# Patient Record
Sex: MALE | Race: WHITE | NOT HISPANIC OR LATINO | Employment: FULL TIME | URBAN - METROPOLITAN AREA
[De-identification: names, ages, dates, MRNs, and addresses within clinical notes are randomized per-mention and may not be internally consistent; named-entity substitution may affect disease eponyms.]

---

## 2017-02-21 ENCOUNTER — OFFICE VISIT (OUTPATIENT)
Dept: INTERNAL MEDICINE | Age: 57
End: 2017-02-21

## 2017-02-21 VITALS
TEMPERATURE: 97.6 F | HEART RATE: 74 BPM | WEIGHT: 227.4 LBS | BODY MASS INDEX: 36.55 KG/M2 | HEIGHT: 66 IN | OXYGEN SATURATION: 96 % | DIASTOLIC BLOOD PRESSURE: 82 MMHG | SYSTOLIC BLOOD PRESSURE: 124 MMHG

## 2017-02-21 DIAGNOSIS — R73.09 ELEVATED GLUCOSE: ICD-10-CM

## 2017-02-21 DIAGNOSIS — R15.2 RECTAL URGENCY: Primary | ICD-10-CM

## 2017-02-21 DIAGNOSIS — Z00.00 ROUTINE HEALTH MAINTENANCE: ICD-10-CM

## 2017-02-21 PROBLEM — E55.9 AVITAMINOSIS D: Status: ACTIVE | Noted: 2017-02-21

## 2017-02-21 LAB
DEVELOPER EXPIRATION DATE: NORMAL
DEVELOPER LOT NUMBER: NORMAL
EXPIRATION DATE: NORMAL
FECAL OCCULT BLOOD SCREEN, POC: NEGATIVE
Lab: NORMAL
NEGATIVE CONTROL: NEGATIVE
POSITIVE CONTROL: POSITIVE

## 2017-02-21 PROCEDURE — 82274 ASSAY TEST FOR BLOOD FECAL: CPT | Performed by: INTERNAL MEDICINE

## 2017-02-21 PROCEDURE — 99204 OFFICE O/P NEW MOD 45 MIN: CPT | Performed by: INTERNAL MEDICINE

## 2017-02-21 PROCEDURE — 90471 IMMUNIZATION ADMIN: CPT | Performed by: INTERNAL MEDICINE

## 2017-02-21 PROCEDURE — 90715 TDAP VACCINE 7 YRS/> IM: CPT | Performed by: INTERNAL MEDICINE

## 2017-02-21 RX ORDER — LORATADINE 10 MG/1
1 TABLET ORAL DAILY
COMMUNITY
Start: 2014-08-11 | End: 2017-04-06 | Stop reason: ALTCHOICE

## 2017-02-21 NOTE — PROGRESS NOTES
Subjective   Junior Estrada Jr. is a 56 y.o. male.     History of Present Illness patient notes recent onset of rectal urgency with significant bowel movement.  Stools have been formed, no diarrhea noted, and no blood in the stool.  Last colonoscopy was done at least 10 if not more years ago.  His been no associated abdominal pain.  These 2 episodes occurred following a meal.  The nails were fairly bland.. He is concerned that this may be a recurrent pattern and occur when he has no proximity to a restroom.  The last colonoscopy was done because of concern about possible irritable bowel syndrome.  Findings at the procedure include diverticuli only.. He notes no specific foods which seem to cause problems for him in the past he has had some issues with decreased frequency of stooling but would not describe this as constipation.    History of elevated glucose, patient is not had an A1c in quite a long time.  His control this well with weight loss and exercise.    Health maintenance: He is overdue for tetanus immunization today.    Ophthalmology, possibly 10 months ago, dentist in the past month.  Exercise walking 10,000 steps daily.    The following portions of the patient's history were reviewed and updated as appropriate: allergies, current medications, past family history, past medical history, past social history, past surgical history and problem list.    Review of Systems   Constitutional: Negative.    HENT: Negative.    Eyes: Negative.    Respiratory: Positive for cough.         Intermittent over the past several months, controlled with as needed use of Mucinex.  Last chest x-ray unknown.    Cardiovascular: Negative.    Gastrointestinal:        See history of present illness   Endocrine: Negative.    Genitourinary: Negative.    Musculoskeletal: Negative.    Skin: Negative.    Allergic/Immunologic: Negative for immunocompromised state.   Neurological: Negative.    Hematological: Negative.     Psychiatric/Behavioral: Negative.        Objective   Physical Exam   Constitutional: He is oriented to person, place, and time. He appears well-developed and well-nourished. No distress.   HENT:   Head: Normocephalic and atraumatic.   Right Ear: Tympanic membrane, external ear and ear canal normal.   Left Ear: Tympanic membrane, external ear and ear canal normal.   Mouth/Throat: Uvula is midline, oropharynx is clear and moist and mucous membranes are normal.   Eyes: Conjunctivae and EOM are normal. Pupils are equal, round, and reactive to light.   Neck: Normal range of motion. Neck supple. No JVD present. Carotid bruit is not present. No thyromegaly present.   Cardiovascular: Normal rate, regular rhythm, normal heart sounds and intact distal pulses.  Exam reveals no gallop and no friction rub.    No murmur heard.  Pulmonary/Chest: Effort normal and breath sounds normal. He has no wheezes. He has no rales.   Abdominal: Soft. Bowel sounds are normal. There is no hepatosplenomegaly. There is no tenderness.   Genitourinary: Prostate normal. Prostate is not enlarged and not tender.   Musculoskeletal: Normal range of motion. He exhibits no edema or deformity.   Lymphadenopathy:     He has no cervical adenopathy.   Neurological: He is alert and oriented to person, place, and time. He has normal strength. No sensory deficit.   Skin: Skin is warm and dry. No rash noted.   Psychiatric: He has a normal mood and affect. His speech is normal and behavior is normal. Judgment and thought content normal. Cognition and memory are normal.   Nursing note and vitals reviewed.      Assessment/Plan   Junior was seen today for rectal urgency.    Diagnoses and all orders for this visit:    Rectal urgency  -     CBC & Differential  -     Comprehensive Metabolic Panel  -     POC Occult Blood Stool  -     Ambulatory Referral For Screening Colonoscopy  -     hyoscyamine (LEVSIN/SL) 0.125 MG SL tablet; Take 1 tablet by mouth 3 (Three) Times  a Day Before Meals.    Elevated glucose  -     Hemoglobin A1c  -     Microalbumin / Creatinine Urine Ratio    Routine health maintenance  -     Tdap Vaccine Greater Than or Equal To 6yo IM  -     Lipid Panel  -     PSA        Number-one  Set of rectal urgency, differential diagnosis includes irritable bowel, celiac disease, food related issues, or structural abnormalities in the colon.  We'll check lab as above, along with a consult for colonoscopy.  Would treat empirically with Levsin for 10 days to see if this provides any degree of relief from symptoms..  Patient to contact me by phone in 10-14 days with an update on his symptoms.    #2 history of elevated glucose, controlled to be determined.  Lab as above, follow-up results by mail or online as appropriate..    #3 routine health maintenance, Adacel today, we'll also check PSA.

## 2017-02-25 DIAGNOSIS — R19.4 ALTERED BOWEL HABITS: Primary | ICD-10-CM

## 2017-02-25 LAB
ALBUMIN SERPL-MCNC: 4.3 G/DL (ref 3.5–5.2)
ALBUMIN/CREAT UR: <2.5 MG/G CREAT (ref 0–30)
ALBUMIN/GLOB SERPL: 1.8 G/DL
ALP SERPL-CCNC: 64 U/L (ref 39–117)
ALT SERPL-CCNC: 16 U/L (ref 1–41)
AST SERPL-CCNC: 14 U/L (ref 1–40)
BASOPHILS # BLD AUTO: 0.02 10*3/MM3 (ref 0–0.2)
BASOPHILS NFR BLD AUTO: 0.4 % (ref 0–1.5)
BILIRUB SERPL-MCNC: 0.6 MG/DL (ref 0.1–1.2)
BUN SERPL-MCNC: 16 MG/DL (ref 6–20)
BUN/CREAT SERPL: 13.7 (ref 7–25)
CALCIUM SERPL-MCNC: 9.4 MG/DL (ref 8.6–10.5)
CHLORIDE SERPL-SCNC: 103 MMOL/L (ref 98–107)
CHOLEST SERPL-MCNC: 207 MG/DL (ref 0–200)
CO2 SERPL-SCNC: 24.7 MMOL/L (ref 22–29)
CREAT SERPL-MCNC: 1.17 MG/DL (ref 0.76–1.27)
CREAT UR-MCNC: 119.5 MG/DL
EOSINOPHIL # BLD AUTO: 0.17 10*3/MM3 (ref 0–0.7)
EOSINOPHIL NFR BLD AUTO: 3.4 % (ref 0.3–6.2)
ERYTHROCYTE [DISTWIDTH] IN BLOOD BY AUTOMATED COUNT: 12.6 % (ref 11.5–14.5)
GLOBULIN SER CALC-MCNC: 2.4 GM/DL
GLUCOSE SERPL-MCNC: 98 MG/DL (ref 65–99)
HBA1C MFR BLD: 5.3 % (ref 4.8–5.6)
HCT VFR BLD AUTO: 43.1 % (ref 40.4–52.2)
HDLC SERPL-MCNC: 69 MG/DL (ref 40–60)
HGB BLD-MCNC: 14.5 G/DL (ref 13.7–17.6)
IMM GRANULOCYTES # BLD: 0 10*3/MM3 (ref 0–0.03)
IMM GRANULOCYTES NFR BLD: 0 % (ref 0–0.5)
LDLC SERPL CALC-MCNC: 105 MG/DL (ref 0–100)
LYMPHOCYTES # BLD AUTO: 1.92 10*3/MM3 (ref 0.9–4.8)
LYMPHOCYTES NFR BLD AUTO: 38.7 % (ref 19.6–45.3)
MCH RBC QN AUTO: 30.5 PG (ref 27–32.7)
MCHC RBC AUTO-ENTMCNC: 33.6 G/DL (ref 32.6–36.4)
MCV RBC AUTO: 90.5 FL (ref 79.8–96.2)
MICROALBUMIN UR-MCNC: <3 UG/ML
MONOCYTES # BLD AUTO: 0.69 10*3/MM3 (ref 0.2–1.2)
MONOCYTES NFR BLD AUTO: 13.9 % (ref 5–12)
NEUTROPHILS # BLD AUTO: 2.16 10*3/MM3 (ref 1.9–8.1)
NEUTROPHILS NFR BLD AUTO: 43.6 % (ref 42.7–76)
PLATELET # BLD AUTO: 201 10*3/MM3 (ref 140–500)
POTASSIUM SERPL-SCNC: 4.6 MMOL/L (ref 3.5–5.2)
PROT SERPL-MCNC: 6.7 G/DL (ref 6–8.5)
PSA SERPL-MCNC: 0.38 NG/ML (ref 0–4)
RBC # BLD AUTO: 4.76 10*6/MM3 (ref 4.6–6)
SODIUM SERPL-SCNC: 141 MMOL/L (ref 136–145)
TRIGL SERPL-MCNC: 166 MG/DL (ref 0–150)
VLDLC SERPL CALC-MCNC: 33.2 MG/DL (ref 5–40)
WBC # BLD AUTO: 4.96 10*3/MM3 (ref 4.5–10.7)

## 2017-02-28 LAB
ENDOMYSIUM IGA SER QL: NEGATIVE
GLIADIN PEPTIDE IGA SER-ACNC: 4 UNITS (ref 0–19)
GLIADIN PEPTIDE IGG SER-ACNC: 2 UNITS (ref 0–19)
IGA SERPL-MCNC: 207 MG/DL (ref 90–386)
Lab: NORMAL
TTG IGA SER-ACNC: <2 U/ML (ref 0–3)
TTG IGG SER-ACNC: 2 U/ML (ref 0–5)
WRITTEN AUTHORIZATION: NORMAL

## 2017-03-30 ENCOUNTER — DOCUMENTATION (OUTPATIENT)
Dept: INTERNAL MEDICINE | Age: 57
End: 2017-03-30

## 2017-04-06 ENCOUNTER — OFFICE VISIT (OUTPATIENT)
Dept: GASTROENTEROLOGY | Facility: CLINIC | Age: 57
End: 2017-04-06

## 2017-04-06 VITALS
WEIGHT: 221 LBS | HEIGHT: 66 IN | SYSTOLIC BLOOD PRESSURE: 126 MMHG | BODY MASS INDEX: 35.52 KG/M2 | DIASTOLIC BLOOD PRESSURE: 88 MMHG

## 2017-04-06 DIAGNOSIS — R15.2 FECAL URGENCY: ICD-10-CM

## 2017-04-06 DIAGNOSIS — Z12.11 ENCOUNTER FOR SCREENING FOR MALIGNANT NEOPLASM OF COLON: Primary | ICD-10-CM

## 2017-04-06 PROCEDURE — 99204 OFFICE O/P NEW MOD 45 MIN: CPT | Performed by: INTERNAL MEDICINE

## 2017-04-06 RX ORDER — SODIUM CHLORIDE, SODIUM LACTATE, POTASSIUM CHLORIDE, CALCIUM CHLORIDE 600; 310; 30; 20 MG/100ML; MG/100ML; MG/100ML; MG/100ML
30 INJECTION, SOLUTION INTRAVENOUS CONTINUOUS
Status: CANCELLED | OUTPATIENT
Start: 2017-04-06

## 2017-04-06 RX ORDER — SODIUM CHLORIDE 0.9 % (FLUSH) 0.9 %
1-10 SYRINGE (ML) INJECTION AS NEEDED
Status: CANCELLED | OUTPATIENT
Start: 2017-04-06

## 2017-04-06 NOTE — PROGRESS NOTES
Subjective   Junior Estrada Jr. is a 56 y.o. male is being seen for consultation today at the request of No ref. provider found  Chief Complaint   Patient presents with   • GI Problem     rectal urgency     History of Present Illness  Patient is here primarily to schedule colonoscopy for routine screening.  His last examination was about 10 years ago.  What prompted him at this time is about a few weeks ago he had 1 or 2 episodes of fecal urgency and feels he was rojelio that he didn't soil himself.  The symptoms have resolved since then.  The following portions of the patient's history were reviewed and updated as appropriate: allergies, current medications, past family history, past medical history, past social history, past surgical history and problem list.    Review of Systems   Constitutional: Negative for appetite change, diaphoresis, fatigue, fever and unexpected weight change.   HENT: Negative for hearing loss, mouth sores, sore throat and trouble swallowing.    Eyes: Negative for pain and redness.   Respiratory: Negative for choking and shortness of breath.    Cardiovascular: Negative for chest pain and leg swelling.   Gastrointestinal: Negative for abdominal distention, abdominal pain, anal bleeding, blood in stool, constipation, diarrhea, nausea, rectal pain and vomiting.   Genitourinary: Negative for flank pain and hematuria.   Musculoskeletal: Negative for arthralgias and joint swelling.   Skin: Negative for color change and rash.   Allergic/Immunologic: Negative for food allergies and immunocompromised state.   Neurological: Negative for dizziness, seizures and headaches.   Hematological: Does not bruise/bleed easily.   Psychiatric/Behavioral: Negative for confusion, sleep disturbance and suicidal ideas. The patient is not nervous/anxious.        Objective   Physical Exam   Constitutional: He is oriented to person, place, and time. He appears well-developed and well-nourished.   HENT:   Head:  Normocephalic and atraumatic.   Nose: Nose normal.   Eyes: Conjunctivae are normal. Pupils are equal, round, and reactive to light.   Neck: Normal range of motion. Neck supple. No thyromegaly present.   Cardiovascular: Normal heart sounds.  Exam reveals no gallop and no friction rub.    No murmur heard.  Pulmonary/Chest: Effort normal and breath sounds normal.   Abdominal: Soft. Bowel sounds are normal. He exhibits no distension and no mass. There is no tenderness.   Musculoskeletal: He exhibits no edema.   Lymphadenopathy:     He has no cervical adenopathy.   Neurological: He is alert and oriented to person, place, and time.   Skin: No rash noted. No erythema.   Psychiatric: He has a normal mood and affect. His behavior is normal.   Nursing note and vitals reviewed.        Assessment/Plan   Problems Addressed this Visit        Digestive    Fecal urgency       Other    Encounter for screening for malignant neoplasm of colon - Primary    Relevant Orders    Case Request (Completed)        Colonoscopy.

## 2017-05-08 ENCOUNTER — ANESTHESIA EVENT (OUTPATIENT)
Dept: GASTROENTEROLOGY | Facility: HOSPITAL | Age: 57
End: 2017-05-08

## 2017-05-08 ENCOUNTER — HOSPITAL ENCOUNTER (OUTPATIENT)
Facility: HOSPITAL | Age: 57
Setting detail: HOSPITAL OUTPATIENT SURGERY
Discharge: HOME | End: 2017-05-08
Attending: INTERNAL MEDICINE | Admitting: INTERNAL MEDICINE

## 2017-05-08 ENCOUNTER — ANESTHESIA (OUTPATIENT)
Dept: GASTROENTEROLOGY | Facility: HOSPITAL | Age: 57
End: 2017-05-08

## 2017-05-08 VITALS
HEIGHT: 66 IN | DIASTOLIC BLOOD PRESSURE: 90 MMHG | OXYGEN SATURATION: 99 % | HEART RATE: 60 BPM | WEIGHT: 225.4 LBS | TEMPERATURE: 98 F | BODY MASS INDEX: 36.22 KG/M2 | SYSTOLIC BLOOD PRESSURE: 118 MMHG | RESPIRATION RATE: 16 BRPM

## 2017-05-08 DIAGNOSIS — Z12.11 ENCOUNTER FOR SCREENING FOR MALIGNANT NEOPLASM OF COLON: ICD-10-CM

## 2017-05-08 PROCEDURE — 45385 COLONOSCOPY W/LESION REMOVAL: CPT | Performed by: INTERNAL MEDICINE

## 2017-05-08 PROCEDURE — 25010000002 PROPOFOL 10 MG/ML EMULSION: Performed by: ANESTHESIOLOGY

## 2017-05-08 PROCEDURE — 88305 TISSUE EXAM BY PATHOLOGIST: CPT | Performed by: INTERNAL MEDICINE

## 2017-05-08 RX ORDER — PROPOFOL 10 MG/ML
VIAL (ML) INTRAVENOUS CONTINUOUS PRN
Status: DISCONTINUED | OUTPATIENT
Start: 2017-05-08 | End: 2017-05-08 | Stop reason: SURG

## 2017-05-08 RX ORDER — SODIUM CHLORIDE 0.9 % (FLUSH) 0.9 %
1-10 SYRINGE (ML) INJECTION AS NEEDED
Status: DISCONTINUED | OUTPATIENT
Start: 2017-05-08 | End: 2017-05-08 | Stop reason: HOSPADM

## 2017-05-08 RX ORDER — PROPOFOL 10 MG/ML
VIAL (ML) INTRAVENOUS AS NEEDED
Status: DISCONTINUED | OUTPATIENT
Start: 2017-05-08 | End: 2017-05-08 | Stop reason: SURG

## 2017-05-08 RX ORDER — LORATADINE 10 MG/1
10 TABLET ORAL DAILY
COMMUNITY

## 2017-05-08 RX ORDER — LIDOCAINE HYDROCHLORIDE 20 MG/ML
INJECTION, SOLUTION INFILTRATION; PERINEURAL AS NEEDED
Status: DISCONTINUED | OUTPATIENT
Start: 2017-05-08 | End: 2017-05-08 | Stop reason: SURG

## 2017-05-08 RX ORDER — SODIUM CHLORIDE, SODIUM LACTATE, POTASSIUM CHLORIDE, CALCIUM CHLORIDE 600; 310; 30; 20 MG/100ML; MG/100ML; MG/100ML; MG/100ML
30 INJECTION, SOLUTION INTRAVENOUS CONTINUOUS
Status: DISCONTINUED | OUTPATIENT
Start: 2017-05-08 | End: 2017-05-08 | Stop reason: HOSPADM

## 2017-05-08 RX ADMIN — LIDOCAINE HYDROCHLORIDE 50 MG: 20 INJECTION, SOLUTION INFILTRATION; PERINEURAL at 09:19

## 2017-05-08 RX ADMIN — PROPOFOL 140 MCG/KG/MIN: 10 INJECTION, EMULSION INTRAVENOUS at 09:22

## 2017-05-08 RX ADMIN — PROPOFOL 200 MG: 10 INJECTION, EMULSION INTRAVENOUS at 09:20

## 2017-05-08 RX ADMIN — SODIUM CHLORIDE, POTASSIUM CHLORIDE, SODIUM LACTATE AND CALCIUM CHLORIDE 30 ML/HR: 600; 310; 30; 20 INJECTION, SOLUTION INTRAVENOUS at 09:01

## 2017-05-09 PROBLEM — D12.6 TUBULAR ADENOMA OF COLON: Status: ACTIVE | Noted: 2017-05-09

## 2017-05-09 LAB
CYTO UR: NORMAL
LAB AP CASE REPORT: NORMAL
Lab: NORMAL
PATH REPORT.FINAL DX SPEC: NORMAL
PATH REPORT.GROSS SPEC: NORMAL

## 2018-05-03 ENCOUNTER — DOCUMENTATION (OUTPATIENT)
Dept: INTERNAL MEDICINE | Age: 58
End: 2018-05-03

## 2018-05-03 PROBLEM — K21.9 GASTROESOPHAGEAL REFLUX DISEASE WITHOUT ESOPHAGITIS: Status: ACTIVE | Noted: 2018-05-03

## 2018-05-03 RX ORDER — OMEPRAZOLE 20 MG/1
20 CAPSULE, DELAYED RELEASE ORAL DAILY
Qty: 30 CAPSULE | Refills: 3 | Status: SHIPPED | OUTPATIENT
Start: 2018-05-03 | End: 2018-07-13 | Stop reason: SDUPTHER

## 2018-05-03 NOTE — PROGRESS NOTES
I was contacted by the patient yesterday regarding heartburn issues.  He treated this with a two-week course of omeprazole in December and that helped with symptoms significantly.  Within one day all symptoms completely resolved.  Symptoms recurred after discontinuing the medication.  In April he took another 2 week course and had the same result.  At this point he is experiencing symptoms more frequently.  He denied exertional chest pain.  This point I suggest omeprazole 20 mg.  Patient will also be sent a handout on reflux.  He does not respond as expected, may consider upper endoscopy.  Patient is advised to lose weight, avoid eating late at night, carbonated beverages, chocolate and peppermint.  I asked him to contact me in 30 days after using medication or sooner if the symptoms worsen.  Dr. Gupta

## 2018-05-08 ENCOUNTER — TELEPHONE (OUTPATIENT)
Dept: INTERNAL MEDICINE | Age: 58
End: 2018-05-08

## 2018-05-08 NOTE — TELEPHONE ENCOUNTER
Documentation     5/3/2018  [facility]   Gopal Gupta MD   Internal Medicine    Progress Notes        I was contacted by the patient yesterday regarding heartburn issues.  He treated this with a two-week course of omeprazole in December and that helped with symptoms significantly.  Within one day all symptoms completely resolved.  Symptoms recurred after discontinuing the medication.  In April he took another 2 week course and had the same result.  At this point he is experiencing symptoms more frequently.  He denied exertional chest pain.  This point I suggest omeprazole 20 mg.  Patient will also be sent a handout on reflux.  He does not respond as expected, may consider upper endoscopy.  Patient is advised to lose weight, avoid eating late at night, carbonated beverages, chocolate and peppermint.  I asked him to contact me in 30 days after using medication or sooner if the symptoms worsen.  Dr. Gupta

## 2018-05-08 NOTE — TELEPHONE ENCOUNTER
Called patient and he is aware he should try the medication for 30 days then call with an update.

## 2018-05-08 NOTE — TELEPHONE ENCOUNTER
Patient spoke to Dr. Gupta over the weekend but was told by him to call you to get an appointment with him. Please call the patient back at [phone].

## 2018-06-11 RX ORDER — HYOSCYAMINE SULFATE 0.12 MG/1
1 TABLET SUBLINGUAL
Qty: 120 EACH | Refills: 3 | Status: SHIPPED | OUTPATIENT
Start: 2018-06-11 | End: 2019-01-04

## 2018-07-13 RX ORDER — OMEPRAZOLE 20 MG/1
20 CAPSULE, DELAYED RELEASE ORAL DAILY
Qty: 90 CAPSULE | Refills: 1 | Status: SHIPPED | OUTPATIENT
Start: 2018-07-13 | End: 2019-01-04 | Stop reason: SDUPTHER

## 2019-01-04 ENCOUNTER — OFFICE VISIT (OUTPATIENT)
Dept: INTERNAL MEDICINE | Age: 59
End: 2019-01-04

## 2019-01-04 VITALS
HEIGHT: 66 IN | HEART RATE: 79 BPM | OXYGEN SATURATION: 95 % | WEIGHT: 249.2 LBS | BODY MASS INDEX: 40.05 KG/M2 | SYSTOLIC BLOOD PRESSURE: 140 MMHG | TEMPERATURE: 96.1 F | DIASTOLIC BLOOD PRESSURE: 90 MMHG

## 2019-01-04 DIAGNOSIS — Z00.00 ROUTINE HEALTH MAINTENANCE: Primary | ICD-10-CM

## 2019-01-04 DIAGNOSIS — K21.9 GASTROESOPHAGEAL REFLUX DISEASE WITHOUT ESOPHAGITIS: ICD-10-CM

## 2019-01-04 DIAGNOSIS — N39.43 BENIGN PROSTATIC HYPERPLASIA WITH POST-VOID DRIBBLING: ICD-10-CM

## 2019-01-04 DIAGNOSIS — R03.0 ELEVATED BLOOD PRESSURE READING WITHOUT DIAGNOSIS OF HYPERTENSION: ICD-10-CM

## 2019-01-04 DIAGNOSIS — N40.1 BENIGN PROSTATIC HYPERPLASIA WITH POST-VOID DRIBBLING: ICD-10-CM

## 2019-01-04 DIAGNOSIS — Z86.39 HISTORY OF ELEVATED GLUCOSE: ICD-10-CM

## 2019-01-04 PROCEDURE — 99396 PREV VISIT EST AGE 40-64: CPT | Performed by: INTERNAL MEDICINE

## 2019-01-04 PROCEDURE — 82270 OCCULT BLOOD FECES: CPT | Performed by: INTERNAL MEDICINE

## 2019-01-04 PROCEDURE — 90471 IMMUNIZATION ADMIN: CPT | Performed by: INTERNAL MEDICINE

## 2019-01-04 PROCEDURE — 90674 CCIIV4 VAC NO PRSV 0.5 ML IM: CPT | Performed by: INTERNAL MEDICINE

## 2019-01-04 RX ORDER — TAMSULOSIN HYDROCHLORIDE 0.4 MG/1
1 CAPSULE ORAL NIGHTLY
COMMUNITY
End: 2019-01-04 | Stop reason: SDUPTHER

## 2019-01-04 RX ORDER — TAMSULOSIN HYDROCHLORIDE 0.4 MG/1
1 CAPSULE ORAL NIGHTLY
Qty: 30 CAPSULE | Refills: 0 | Status: SHIPPED | OUTPATIENT
Start: 2019-01-04 | End: 2019-01-27 | Stop reason: SDUPTHER

## 2019-01-04 RX ORDER — HYOSCYAMINE SULFATE 0.12 MG/1
1 TABLET SUBLINGUAL 3 TIMES DAILY PRN
Qty: 120 EACH | Refills: 3 | COMMUNITY
Start: 2019-01-04 | End: 2019-05-23

## 2019-01-04 RX ORDER — OMEPRAZOLE 20 MG/1
CAPSULE, DELAYED RELEASE ORAL
Qty: 90 CAPSULE | Refills: 1 | Status: SHIPPED | OUTPATIENT
Start: 2019-01-04 | End: 2019-06-18 | Stop reason: SDUPTHER

## 2019-01-04 NOTE — PROGRESS NOTES
[facility] [place] [facility]  [address] MD      Junior Estrada . / 58 y.o. / male  01/04/2019      ASSESSMENT & PLAN:    Problem List Items Addressed This Visit        Unprioritized    Routine health maintenance - Primary    Relevant Medications    Hyoscyamine Sulfate SL (LEVSIN/SL) 0.125 MG sublingual tablet    Other Relevant Orders    Flucelvax Quad=>4Years (PFS)    CBC & Differential    Comprehensive Metabolic Panel    Hepatitis C Antibody    Lipid Panel    POC Occult Blood Stool    PSA DIAGNOSTIC    Urinalysis With Microscopic If Indicated (No Culture) - Urine, Clean Catch    Gastroesophageal reflux disease without esophagitis    Overview     May 2, 2018         Relevant Medications    omeprazole (PRILOSEC) 20 MG capsule    Hyoscyamine Sulfate SL (LEVSIN/SL) 0.125 MG sublingual tablet      Other Visit Diagnoses     History of elevated glucose        Relevant Orders    Hemoglobin A1c    Benign prostatic hyperplasia with post-void dribbling            Orders Placed This Encounter   Procedures   • Flucelvax Quad=>4Years (PFS)   • Comprehensive Metabolic Panel   • Hepatitis C Antibody   • Lipid Panel   • PSA DIAGNOSTIC   • Urinalysis With Microscopic If Indicated (No Culture) - Urine, Clean Catch   • Hemoglobin A1c   • POC Occult Blood Stool   • CBC & Differential       Summary/Discussion:    Number-one routine health maintenance, clinically stable, my main concern today is patient's weight.  We did discuss several options for exercise to avoid his orthopedic issues and also suggested Weight Watchers.  Weight loss will help with both reflux symptoms and overall cardiovascular fitness.  Recommend repeat exam in one year.  Laboratory to include UA, CBC, CMP, A1c, hepatitis C antibody, lipid PSA.  Recommend hepatitis A series as well as shingles when available sometime during the next 12 months.    #2 history of elevated glucose, will check FBS as above and A1c.    #3 reflux, continue meds, weight loss would be  "helpful.    #4  BPH with symptoms of post void dribbling.  We'll try empiric therapy with Flomax 0.4 mg 1 at bedtime, patient may increase to 2 tablets as needed.  If this is not helpful we'll discontinue the medication.    #5 elevated blood pressure without specific diagnosis of hypertension, patient will monitor blood pressure at work and contact me in about a week with an updated readings.      Return in about 1 year (around 1/4/2020) for Annual physical.    ____________________________________________________________________    VITALS    Visit Vitals  /90   Pulse 79   Temp 96.1 °F (35.6 °C) (Temporal)   Ht 167.6 cm (65.98\")   Wt 113 kg (249 lb 3.2 oz)   SpO2 95%   BMI 40.24 kg/m²       BP Readings from Last 3 Encounters:   01/04/19 140/90   05/08/17 118/90   04/06/17 126/88     Wt Readings from Last 3 Encounters:   01/04/19 113 kg (249 lb 3.2 oz)   05/08/17 102 kg (225 lb 6.4 oz)   04/06/17 100 kg (221 lb)      Body mass index is 40.24 kg/m².    CC:  Main reason(s) for today's visit: Annual Exam (CPE)      HPI: Patient presents this morning for annual physical exam.    Health maintenance: Last ophthalmology visit yearly in April, dentist every 6 months, last visit October 2018.  Exercise walking.    Patient has history of elevated glucose this was controlled the past with weight reduction.  We'll check A1c today.    Weight control: Exercise is limited because of ongoing issues with heel spurs.  This is been injected in the past, is also wearing orthotics but the issue still restrict his ability to walk.  We did discuss alternative forms of exercise to include stairstepper or a bike.  He does have access to multiple forms of aerobic activity at [employer].  I will also suggested consideration of weight watchers..       patient has history of reflux and this is been controlled with use of omeprazole.  He is aware that weight loss will help control symptoms as well.    History of rectal urgency, this is well " controlled with use of Levsin on an as-needed basis.        Patient Care Team:  Gopal Gupta MD as PCP - General (Internal Medicine)  ____________________________________________________________________    REVIEW OF SYSTEMS    Review of Systems   Constitutional: Negative.    HENT: Negative.    Eyes: Negative.    Respiratory: Negative.    Cardiovascular: Negative.    Gastrointestinal: Negative.    Endocrine: Negative.    Genitourinary:        Patient does have increased frequency of urine, there is some sensation of incomplete emptying and some terminal dribbling.   Musculoskeletal: Negative.    Skin: Negative.    Allergic/Immunologic: Negative for immunocompromised state.   Neurological: Negative.    Hematological: Negative.    Psychiatric/Behavioral: Negative.          PHYSICAL EXAMINATION    Physical Exam   Constitutional: He is oriented to person, place, and time. He appears well-developed. No distress.   OBESE   HENT:   Head: Normocephalic and atraumatic.   Right Ear: Tympanic membrane, external ear and ear canal normal.   Left Ear: Tympanic membrane, external ear and ear canal normal.   Mouth/Throat: Uvula is midline, oropharynx is clear and moist and mucous membranes are normal.   Eyes: Conjunctivae and EOM are normal. Pupils are equal, round, and reactive to light.   Neck: Normal range of motion. Neck supple. No JVD present. Carotid bruit is not present. No thyromegaly present.   Cardiovascular: Normal rate, regular rhythm, normal heart sounds and intact distal pulses. Exam reveals no gallop and no friction rub.   No murmur heard.  Pulmonary/Chest: Effort normal and breath sounds normal. He has no wheezes. He has no rales.   Abdominal: Soft. Bowel sounds are normal. There is no hepatosplenomegaly. There is no tenderness.   Genitourinary: Rectum normal and prostate normal.   Genitourinary Comments: Digital rectal exam shows no nodules.   Musculoskeletal: Normal range of motion. He exhibits no edema or  deformity.   Lymphadenopathy:     He has no cervical adenopathy.   Neurological: He is alert and oriented to person, place, and time. He has normal strength and normal reflexes. No cranial nerve deficit or sensory deficit. Coordination normal.   Skin: Skin is warm and dry. No rash noted.   Psychiatric: He has a normal mood and affect. His speech is normal and behavior is normal. Judgment and thought content normal. Cognition and memory are normal.   Nursing note and vitals reviewed.        REVIEWED DATA:    Labs:     Lab Results   Component Value Date     02/24/2017    K 4.6 02/24/2017    AST 14 02/24/2017    ALT 16 02/24/2017    BUN 16 02/24/2017    CREATININE 1.17 02/24/2017    EGFRIFNONA 64 02/24/2017    EGFRIFAFRI 78 02/24/2017       Lab Results   Component Value Date    HGBA1C 5.30 02/24/2017    MICROALBUR <3.0 02/24/2017       Lab Results   Component Value Date     (H) 02/24/2017    HDL 69 (H) 02/24/2017    TRIG 166 (H) 02/24/2017       No results found for: TSH, FREET4    Lab Results   Component Value Date    WBC 4.96 02/24/2017    HGB 14.5 02/24/2017     02/24/2017       Lab Results   Component Value Date    PSA 0.377 02/24/2017         Imaging:         Medical Tests:         Summary of old records / correspondence / consultant report:         Request outside records:         ALLERGIES  Allergies   Allergen Reactions   • No Known Drug Allergy         MEDICATIONS  Current Outpatient Medications   Medication Sig Dispense Refill   • tamsulosin (FLOMAX) 0.4 MG capsule 24 hr capsule Take 1 capsule by mouth Every Night. 30 capsule 0   • Hyoscyamine Sulfate SL (LEVSIN/SL) 0.125 MG sublingual tablet Place 1 tablet under the tongue 3 (Three) Times a Day As Needed (Dyspepsia, rectal urgency). 120 each 3   • loratadine (CLARITIN) 10 MG tablet Take 10 mg by mouth Daily.     • omeprazole (PRILOSEC) 20 MG capsule Take 1 capsule by mouth Daily. 90 capsule 01     No current facility-administered  medications for this visit.        PFSH:     The following portions of the patient's history were reviewed and updated as appropriate: Allergies / Current Medications / Past Medical History / Surgical History / Social History / Family History    PROBLEM LIST   Patient Active Problem List   Diagnosis   • Avitaminosis D   • Routine health maintenance   • Rectal urgency   • Elevated glucose   • Altered bowel habits   • Encounter for screening for malignant neoplasm of colon   • Fecal urgency   • Tubular adenoma of colon   • Gastroesophageal reflux disease without esophagitis       PAST MEDICAL HISTORY  Past Medical History:   Diagnosis Date   • Cancer (CMS/HCC)    • Cataract     A small cataract has been noted on my left eye since    • Diabetes mellitus (CMS/HCC)     Elevated blood sugar noted on blood test.  Monitored and rem   • Elevated glucose     Controlled with diet   • GERD (gastroesophageal reflux disease)    • History of skin cancer    • Seasonal allergies        SURGICAL HISTORY  Past Surgical History:   Procedure Laterality Date   • CARPAL TUNNEL RELEASE     • COLONOSCOPY N/A 2017    Procedure: COLONOSCOPY into cecum with cold snare polypectomy;  Surgeon: Emmanuel Ayala MD;  Location: [facility] ENDOSCOPY;  Service:    • NASAL POLYP EXCISION     • SKIN CANCER EXCISION         SOCIAL HISTORY  Social History     Socioeconomic History   • Marital status: Single     Spouse name: Not on file   • Number of children: 0   • Years of education: Not on file   • Highest education level: Not on file   Occupational History   • Occupation:    Tobacco Use   • Smoking status: Former Smoker     Packs/day: 0.50     Years: 20.00     Pack years: 10.00     Last attempt to quit: 2000     Years since quittin.0   • Smokeless tobacco: Former User     Types: Snuff, Chew   Substance and Sexual Activity   • Alcohol use: Yes     Alcohol/week: 0.6 oz     Types: 1 - 5 Cans of beer per week      Comment: Occasional drinker.   • Drug use: No   • Sexual activity: Not Currently     Partners: Female     Birth control/protection: None     Comment: Abstinent for 20+ years       FAMILY HISTORY  Family History   Problem Relation Age of Onset   • Dementia Mother    • Hypertension Mother    • Other Mother         Dementia   • Ulcers Father    • Liver cancer Paternal Grandmother        Health Maintenance Due   Topic   • HEPATITIS A VACCINE ADULT (1 of 2)   • ZOSTER VACCINE (1 of 2)       Overdue health maintenance interventions  reviewed with patient.    Dictated utilizing Dragon voice recognition software

## 2019-01-05 LAB
ALBUMIN SERPL-MCNC: 4.3 G/DL (ref 3.5–5.2)
ALBUMIN/GLOB SERPL: 1.3 G/DL
ALP SERPL-CCNC: 56 U/L (ref 39–117)
ALT SERPL-CCNC: 29 U/L (ref 1–41)
APPEARANCE UR: CLEAR
AST SERPL-CCNC: 21 U/L (ref 1–40)
BASOPHILS # BLD AUTO: 0.03 10*3/MM3 (ref 0–0.2)
BASOPHILS NFR BLD AUTO: 0.6 % (ref 0–1.5)
BILIRUB SERPL-MCNC: 0.8 MG/DL (ref 0.1–1.2)
BILIRUB UR QL STRIP: NEGATIVE
BUN SERPL-MCNC: 20 MG/DL (ref 6–20)
BUN/CREAT SERPL: 18.9 (ref 7–25)
CALCIUM SERPL-MCNC: 9.6 MG/DL (ref 8.6–10.5)
CHLORIDE SERPL-SCNC: 102 MMOL/L (ref 98–107)
CHOLEST SERPL-MCNC: 233 MG/DL (ref 0–200)
CO2 SERPL-SCNC: 25.4 MMOL/L (ref 22–29)
COLOR UR: YELLOW
CREAT SERPL-MCNC: 1.06 MG/DL (ref 0.76–1.27)
EOSINOPHIL # BLD AUTO: 0.15 10*3/MM3 (ref 0–0.7)
EOSINOPHIL NFR BLD AUTO: 2.9 % (ref 0.3–6.2)
ERYTHROCYTE [DISTWIDTH] IN BLOOD BY AUTOMATED COUNT: 13 % (ref 11.5–14.5)
GLOBULIN SER CALC-MCNC: 3.2 GM/DL
GLUCOSE SERPL-MCNC: 92 MG/DL (ref 65–99)
GLUCOSE UR QL: NEGATIVE
HBA1C MFR BLD: 5.3 % (ref 4.8–5.6)
HCT VFR BLD AUTO: 43.2 % (ref 40.4–52.2)
HCV AB S/CO SERPL IA: <0.1 S/CO RATIO (ref 0–0.9)
HDLC SERPL-MCNC: 75 MG/DL (ref 40–60)
HGB BLD-MCNC: 14.8 G/DL (ref 13.7–17.6)
HGB UR QL STRIP: NEGATIVE
IMM GRANULOCYTES # BLD AUTO: 0.02 10*3/MM3 (ref 0–0.03)
IMM GRANULOCYTES NFR BLD AUTO: 0.4 % (ref 0–0.5)
KETONES UR QL STRIP: NEGATIVE
LDLC SERPL CALC-MCNC: 135 MG/DL (ref 0–100)
LEUKOCYTE ESTERASE UR QL STRIP: NEGATIVE
LYMPHOCYTES # BLD AUTO: 1.94 10*3/MM3 (ref 0.9–4.8)
LYMPHOCYTES NFR BLD AUTO: 37.2 % (ref 19.6–45.3)
MCH RBC QN AUTO: 30.3 PG (ref 27–32.7)
MCHC RBC AUTO-ENTMCNC: 34.3 G/DL (ref 32.6–36.4)
MCV RBC AUTO: 88.3 FL (ref 79.8–96.2)
MONOCYTES # BLD AUTO: 0.59 10*3/MM3 (ref 0.2–1.2)
MONOCYTES NFR BLD AUTO: 11.3 % (ref 5–12)
NEUTROPHILS # BLD AUTO: 2.5 10*3/MM3 (ref 1.9–8.1)
NEUTROPHILS NFR BLD AUTO: 48 % (ref 42.7–76)
NITRITE UR QL STRIP: NEGATIVE
PH UR STRIP: 6 [PH] (ref 5–8)
PLATELET # BLD AUTO: 199 10*3/MM3 (ref 140–500)
POTASSIUM SERPL-SCNC: 4.2 MMOL/L (ref 3.5–5.2)
PROT SERPL-MCNC: 7.5 G/DL (ref 6–8.5)
PROT UR QL STRIP: NEGATIVE
PSA SERPL-MCNC: 0.55 NG/ML (ref 0–4)
RBC # BLD AUTO: 4.89 10*6/MM3 (ref 4.6–6)
SODIUM SERPL-SCNC: 139 MMOL/L (ref 136–145)
SP GR UR: 1.02 (ref 1–1.03)
TRIGL SERPL-MCNC: 115 MG/DL (ref 0–150)
UROBILINOGEN UR STRIP-MCNC: NORMAL MG/DL
VLDLC SERPL CALC-MCNC: 23 MG/DL (ref 5–40)
WBC # BLD AUTO: 5.21 10*3/MM3 (ref 4.5–10.7)

## 2019-01-07 DIAGNOSIS — E78.5 HYPERLIPIDEMIA, UNSPECIFIED HYPERLIPIDEMIA TYPE: Primary | ICD-10-CM

## 2019-01-07 RX ORDER — ROSUVASTATIN CALCIUM 10 MG/1
10 TABLET, COATED ORAL DAILY
Qty: 30 TABLET | Refills: 3 | Status: SHIPPED | OUTPATIENT
Start: 2019-01-07 | End: 2019-04-09 | Stop reason: SDUPTHER

## 2019-01-07 NOTE — PROGRESS NOTES
Here are your  results from yesterday's lab. All results are acceptable except for the lipid panel. This is associated with a risk of cardiovascular disease of 13% over the next 10 years.Treatment is indicated for any value above 7.5%. Medication and weight loss would reduce this risk by 25%. Please let me know if you would like to try the medication.

## 2019-01-28 RX ORDER — TAMSULOSIN HYDROCHLORIDE 0.4 MG/1
CAPSULE ORAL
Qty: 30 CAPSULE | Refills: 0 | Status: SHIPPED | OUTPATIENT
Start: 2019-01-28 | End: 2019-03-04 | Stop reason: SDUPTHER

## 2019-03-04 RX ORDER — TAMSULOSIN HYDROCHLORIDE 0.4 MG/1
1 CAPSULE ORAL NIGHTLY
Qty: 30 CAPSULE | Refills: 5 | Status: SHIPPED | OUTPATIENT
Start: 2019-03-04 | End: 2019-06-18 | Stop reason: SDUPTHER

## 2019-03-06 LAB
ALT SERPL-CCNC: 20 U/L (ref 1–41)
AST SERPL-CCNC: 15 U/L (ref 1–40)
CHOLEST SERPL-MCNC: 130 MG/DL (ref 0–200)
HDLC SERPL-MCNC: 55 MG/DL (ref 40–60)
LDLC SERPL CALC-MCNC: 56 MG/DL (ref 0–100)
TRIGL SERPL-MCNC: 96 MG/DL (ref 0–150)
VLDLC SERPL CALC-MCNC: 19.2 MG/DL (ref 5–40)

## 2019-04-09 RX ORDER — ROSUVASTATIN CALCIUM 10 MG/1
10 TABLET, COATED ORAL DAILY
Qty: 90 TABLET | Refills: 0 | Status: SHIPPED | OUTPATIENT
Start: 2019-04-09 | End: 2019-08-04 | Stop reason: SDUPTHER

## 2019-05-23 ENCOUNTER — OFFICE VISIT (OUTPATIENT)
Dept: INTERNAL MEDICINE | Age: 59
End: 2019-05-23

## 2019-05-23 VITALS
SYSTOLIC BLOOD PRESSURE: 136 MMHG | HEIGHT: 66 IN | DIASTOLIC BLOOD PRESSURE: 84 MMHG | TEMPERATURE: 98.1 F | OXYGEN SATURATION: 97 % | BODY MASS INDEX: 39.53 KG/M2 | WEIGHT: 246 LBS | HEART RATE: 74 BPM

## 2019-05-23 DIAGNOSIS — N40.1 BENIGN PROSTATIC HYPERPLASIA WITH LOWER URINARY TRACT SYMPTOMS, SYMPTOM DETAILS UNSPECIFIED: Chronic | ICD-10-CM

## 2019-05-23 DIAGNOSIS — G47.33 OSA (OBSTRUCTIVE SLEEP APNEA): Chronic | ICD-10-CM

## 2019-05-23 DIAGNOSIS — E66.9 OBESITY (BMI 30-39.9): Chronic | ICD-10-CM

## 2019-05-23 DIAGNOSIS — J30.1 ALLERGIC RHINITIS DUE TO POLLEN, UNSPECIFIED SEASONALITY: ICD-10-CM

## 2019-05-23 DIAGNOSIS — E78.5 HYPERLIPIDEMIA, UNSPECIFIED HYPERLIPIDEMIA TYPE: Primary | Chronic | ICD-10-CM

## 2019-05-23 DIAGNOSIS — K21.9 GASTROESOPHAGEAL REFLUX DISEASE WITHOUT ESOPHAGITIS: Chronic | ICD-10-CM

## 2019-05-23 PROCEDURE — 99215 OFFICE O/P EST HI 40 MIN: CPT | Performed by: INTERNAL MEDICINE

## 2019-05-23 RX ORDER — AZELASTINE 1 MG/ML
2 SPRAY, METERED NASAL 2 TIMES DAILY
Qty: 1 EACH | Refills: 5 | Status: SHIPPED | OUTPATIENT
Start: 2019-05-23 | End: 2019-06-03 | Stop reason: SDUPTHER

## 2019-05-23 NOTE — ASSESSMENT & PLAN NOTE
Previously was on CPAP. Recommended further weight loss. Consider going back for CPAP titration study.

## 2019-05-23 NOTE — ASSESSMENT & PLAN NOTE
Recommended trying fexofenadine 180 mg qd and adding fluticasone nasal spray BID. Also azelastine NA PRN. Rx sent to pharmacy.

## 2019-05-23 NOTE — PROGRESS NOTES
[facility] [place] [facility]  DENISA ROLDAN M.D.      Junior Estrada Jr. / 58 y.o. / male  05/23/2019      ASSESSMENT & PLAN:    Problem List Items Addressed This Visit        High    Hyperlipidemia - Primary (Chronic)    Current Assessment & Plan     Lab Results   Component Value Date    LDL 56 03/06/2019     (H) 01/04/2019     (H) 02/24/2017    HDL 55 03/06/2019    HDL 75 (H) 01/04/2019    HDL 69 (H) 02/24/2017    TRIG 96 03/06/2019      Continue rosuvastatin 10 mg qHS.          Relevant Medications    rosuvastatin (CRESTOR) 10 MG tablet       Medium    Gastroesophageal reflux disease without esophagitis (Chronic)    Overview     Stable. Continue omeprazole 20 mg qd. Decrease caffeine.          Relevant Medications    omeprazole (priLOSEC) 20 MG capsule    Obesity (BMI 30-39.9) (Chronic)    Current Assessment & Plan     BMI Readings from Last 3 Encounters:   05/23/19 39.72 kg/m²   01/04/19 40.24 kg/m²   05/08/17 36.38 kg/m²      Wt Readings from Last 3 Encounters:   05/23/19 112 kg (246 lb)   01/04/19 113 kg (249 lb 3.2 oz)   05/08/17 102 kg (225 lb 6.4 oz)      · The following were discussed: low calorie, low carb based diet program, portion control, make dinner lightest meal of day, avoid eating sweets, desserts, and excess amount of fruits and increase physical activity (aerobic exercise)            Relevant Orders    TSH+Free T4    Testosterone (Free & Total), LC / MS    Hemoglobin A1c    Allergic rhinitis due to pollen (Chronic)    Current Assessment & Plan     Recommended trying fexofenadine 180 mg qd and adding fluticasone nasal spray BID. Also azelastine NA PRN. Rx sent to pharmacy.          Relevant Medications    azelastine (ASTELIN) 0.1 % nasal spray       Low    Benign prostatic hyperplasia with lower urinary tract symptoms (Chronic)    Overview     Continue tamsulosin 0.4 mg qHS.          JULIO CESAR (obstructive sleep apnea) (Chronic)    Current Assessment & Plan     Previously was on CPAP.  "Recommended further weight loss. Consider going back for CPAP titration study.              Orders Placed This Encounter   Procedures   • TSH+Free T4   • Testosterone (Free & Total), LC / MS   • Hemoglobin A1c     New Medications Ordered This Visit   Medications   • azelastine (ASTELIN) 0.1 % nasal spray     Si sprays into the nostril(s) as directed by provider 2 (Two) Times a Day. Use in each nostril as directed     Dispense:  1 each     Refill:  5       Summary/Discussion:  Spent 45 minutes in face-to-face encounter time and >50% of time spent in counseling regarding above medical problems/issues.      Next Appointment with me: Visit date not found    Return in about 6 months (around 2019) for Reassess chronic medical problems.    ____________________________________________________________________    VITALS:    Visit Vitals  /84   Pulse 74   Temp 98.1 °F (36.7 °C)   Ht 167.6 cm (65.98\")   Wt 112 kg (246 lb)   SpO2 97%   BMI 39.72 kg/m²       BP Readings from Last 3 Encounters:   19 136/84   19 140/90   17 118/90     Wt Readings from Last 3 Encounters:   19 112 kg (246 lb)   19 113 kg (249 lb 3.2 oz)   17 102 kg (225 lb 6.4 oz)      Body mass index is 39.72 kg/m².    CC: Main reason(s) for today's visit: Establish Care (Former Dr Gupta); Heartburn; and Hyperglycemia      HPI:    Patient is a 58 y.o. male who is here to establish care. Former patient of Dr. Gupta.     Prior history of possible DM 2 but highest A1c I see on file is < 5.5. Has lost significant amount of weight.     Chronic hyperlipidemia:  Current therapy include rosuvastatin, denies problems with medication.    Most recent labs:   Lab Results   Component Value Date    LDL 56 2019     (H) 2019     (H) 2017    HDL 55 2019    HDL 75 (H) 2019    HDL 69 (H) 2017    TRIG 96 2019     Obesity: Weight has not changed significantly since last visit, has " made slight dietary improvement.  Weight loss efforts: lower calorie diet, has not implemented increased physical activity.  Current Body mass index is 39.72 kg/m².   Wt Readings from Last 3 Encounters:   05/23/19 112 kg (246 lb)   01/04/19 113 kg (249 lb 3.2 oz)   05/08/17 102 kg (225 lb 6.4 oz)      GERD: Stable on omeprazole. Denies dysphagia.    BPH without QUIROGA on tamsulosin. Normal PSA levels.     History of colon polyp, on 5 yr schedule.       Patient Care Team:  Bill Torres MD as PCP - General (Internal Medicine)  ____________________________________________________________________      REVIEW OF SYSTEMS    Review of Systems   HENT: Positive for congestion.    Respiratory: Negative for chest tightness.    Cardiovascular: Negative for chest pain.   Endocrine: Negative for cold intolerance, polydipsia and polyuria.        Obesity    Genitourinary: Negative for hematuria. Difficulty urinating: stable on flomax.   Allergic/Immunologic: Positive for environmental allergies.   Psychiatric/Behavioral: Positive for agitation.       PHYSICAL EXAMINATION    Physical Exam   Constitutional: He appears well-nourished. No distress.   Obese    HENT:   Head: Normocephalic.   Right Ear: Tympanic membrane normal.   Left Ear: Tympanic membrane normal.   Mouth/Throat: Oropharynx is clear and moist. No oral lesions.   Mellampati Airway Class 4    Eyes: Conjunctivae are normal. Pupils are equal, round, and reactive to light. No scleral icterus.   Neck: Neck supple. No tracheal deviation present. No thyromegaly present.   Cardiovascular: Normal rate, regular rhythm, normal heart sounds and intact distal pulses. Exam reveals no gallop and no friction rub.   No murmur heard.  Pulmonary/Chest: Effort normal and breath sounds normal.   Abdominal: Soft. Bowel sounds are normal. He exhibits no distension and no mass. There is no tenderness. No hernia.   Obese/distended/tense    Musculoskeletal: He exhibits no edema or deformity.    Lymphadenopathy:     He has no cervical adenopathy.        Right: No supraclavicular adenopathy present.        Left: No supraclavicular adenopathy present.   Neurological: He is alert. He has normal reflexes.   Skin: Skin is intact. No rash noted. No erythema. No pallor.   No jaundice   Psychiatric: He has a normal mood and affect. His behavior is normal. Judgment and thought content normal. Cognition and memory are normal.         REVIEWED DATA:    Labs:   Lab Results   Component Value Date     01/04/2019    K 4.2 01/04/2019    CALCIUM 9.6 01/04/2019    AST 15 03/06/2019    ALT 20 03/06/2019    BUN 20 01/04/2019    CREATININE 1.06 01/04/2019    CREATININE 1.17 02/24/2017    EGFRIFNONA 72 01/04/2019    EGFRIFAFRI 87 01/04/2019       Lab Results   Component Value Date    HGBA1C 5.30 01/04/2019    HGBA1C 5.30 02/24/2017    MICROALBUR <3.0 02/24/2017       Lab Results   Component Value Date    LDL 56 03/06/2019     (H) 01/04/2019     (H) 02/24/2017    HDL 55 03/06/2019    TRIG 96 03/06/2019       No results found for: TSH, FREET4       Lab Results   Component Value Date    WBC 5.21 01/04/2019    HGB 14.8 01/04/2019    HGB 14.5 02/24/2017     01/04/2019         Imaging:        Medical Tests:   Colonoscopy : polyp, 5 years     Summary of old records / correspondence / consultant report:        Request outside records:        ______________________________________________________________________    ALLERGIES  Allergies   Allergen Reactions   • No Known Drug Allergy         MEDICATIONS  Current Outpatient Medications   Medication Sig Dispense Refill   • loratadine (CLARITIN) 10 MG tablet Take 10 mg by mouth Daily.     • omeprazole (priLOSEC) 20 MG capsule TAKE 1 CAPSULE DAILY 90 capsule 1   • rosuvastatin (CRESTOR) 10 MG tablet Take 1 tablet by mouth Daily. 90 tablet 0   • tamsulosin (FLOMAX) 0.4 MG capsule 24 hr capsule Take 1 capsule by mouth Every Night. 30 capsule 5       PFSH:     The  following portions of the patient's history were reviewed and updated as appropriate: Allergies / Current Medications / Past Medical History / Surgical History / Social History / Family History    PROBLEM LIST   Patient Active Problem List   Diagnosis   • Tubular adenoma of colon   • Gastroesophageal reflux disease without esophagitis   • Hyperlipidemia   • Benign prostatic hyperplasia with lower urinary tract symptoms   • Obesity (BMI 30-39.9)   • JULIO CESAR (obstructive sleep apnea)   • Allergic rhinitis due to pollen       PAST MEDICAL HISTORY  Past Medical History:   Diagnosis Date   • Cataract     A small cataract has been noted on my left eye since    • Diabetes mellitus (CMS/HCC)     Elevated blood sugar noted on blood test.  Monitored and rem   • Elevated glucose     Controlled with diet   • GERD (gastroesophageal reflux disease)    • History of skin cancer    • Seasonal allergies        SURGICAL HISTORY  Past Surgical History:   Procedure Laterality Date   • CARPAL TUNNEL RELEASE     • COLONOSCOPY N/A 2017    Procedure: COLONOSCOPY into cecum with cold snare polypectomy;  Surgeon: Emmanuel Ayala MD;  Location: [facility] ENDOSCOPY;  Service:    • NASAL POLYP EXCISION     • SKIN CANCER EXCISION         SOCIAL HISTORY  Social History     Socioeconomic History   • Marital status: Single     Spouse name: Not on file   • Number of children: 0   • Years of education: Not on file   • Highest education level: Not on file   Occupational History   • Occupation:  ([employer])   Tobacco Use   • Smoking status: Former Smoker     Packs/day: 0.50     Years: 20.00     Pack years: 10.00     Last attempt to quit: 2000     Years since quittin.4   • Smokeless tobacco: Former User     Types: Snuff, Chew   Substance and Sexual Activity   • Alcohol use: Yes     Frequency: 2-4 times a month     Drinks per session: 1 or 2   • Drug use: No   • Sexual activity: No     Partners: Female     Comment:  Abstinent for 20+ years   Lifestyle   • Physical activity:     Days per week: 5 days     Minutes per session: 20 min   • Stress: To some extent       FAMILY HISTORY  Family History   Problem Relation Age of Onset   • Dementia Mother 70   • Hypertension Mother    • Ulcers Father    • Liver cancer Paternal Grandmother    • No Known Problems Sister    • No Known Problems Brother    • Dementia Maternal Grandmother    • Prostate cancer Maternal Grandfather    • Obesity Sister    • No Known Problems Sister    • Prostate cancer Maternal Uncle    • Dementia Maternal Uncle         had MVA with head injury   • Colon cancer Neg Hx    • Colon polyps Neg Hx          **Dragon Disclaimer:   Much of this encounter note is an electronic transcription/translation of spoken language to printed text. The electronic translation of spoken language may permit erroneous, or at times, nonsensical words or phrases to be inadvertently transcribed. Although I have reviewed the note for such errors, some may still exist.       Template created by Trey Torres MD

## 2019-05-23 NOTE — ASSESSMENT & PLAN NOTE
BMI Readings from Last 3 Encounters:   05/23/19 39.72 kg/m²   01/04/19 40.24 kg/m²   05/08/17 36.38 kg/m²      Wt Readings from Last 3 Encounters:   05/23/19 112 kg (246 lb)   01/04/19 113 kg (249 lb 3.2 oz)   05/08/17 102 kg (225 lb 6.4 oz)      · The following were discussed: low calorie, low carb based diet program, portion control, make dinner lightest meal of day, avoid eating sweets, desserts, and excess amount of fruits and increase physical activity (aerobic exercise)

## 2019-05-23 NOTE — ASSESSMENT & PLAN NOTE
Lab Results   Component Value Date    LDL 56 03/06/2019     (H) 01/04/2019     (H) 02/24/2017    HDL 55 03/06/2019    HDL 75 (H) 01/04/2019    HDL 69 (H) 02/24/2017    TRIG 96 03/06/2019      Continue rosuvastatin 10 mg qHS.

## 2019-05-28 LAB
HBA1C MFR BLD: 5.5 % (ref 4.8–5.6)
T4 FREE SERPL-MCNC: 1.02 NG/DL (ref 0.93–1.7)
TESTOST FREE SERPL-MCNC: 3.2 PG/ML (ref 7.2–24)
TESTOST SERPL-MCNC: 282.6 NG/DL (ref 264–916)
TSH SERPL DL<=0.005 MIU/L-ACNC: 2.01 MIU/ML (ref 0.27–4.2)

## 2019-06-03 DIAGNOSIS — J30.1 ALLERGIC RHINITIS DUE TO POLLEN, UNSPECIFIED SEASONALITY: ICD-10-CM

## 2019-06-03 RX ORDER — AZELASTINE 1 MG/ML
2 SPRAY, METERED NASAL 2 TIMES DAILY
Qty: 137 ML | Refills: 5 | Status: SHIPPED | OUTPATIENT
Start: 2019-06-03 | End: 2020-07-10

## 2019-06-18 RX ORDER — OMEPRAZOLE 20 MG/1
20 CAPSULE, DELAYED RELEASE ORAL DAILY
Qty: 90 CAPSULE | Refills: 1 | Status: SHIPPED | OUTPATIENT
Start: 2019-06-18 | End: 2019-12-13 | Stop reason: SDUPTHER

## 2019-06-18 RX ORDER — TAMSULOSIN HYDROCHLORIDE 0.4 MG/1
1 CAPSULE ORAL NIGHTLY
Qty: 90 CAPSULE | Refills: 1 | Status: SHIPPED | OUTPATIENT
Start: 2019-06-18 | End: 2019-11-22 | Stop reason: SDUPTHER

## 2019-08-06 RX ORDER — ROSUVASTATIN CALCIUM 10 MG/1
TABLET, COATED ORAL
Qty: 90 TABLET | Refills: 0 | Status: SHIPPED | OUTPATIENT
Start: 2019-08-06 | End: 2019-10-14 | Stop reason: SDUPTHER

## 2019-10-14 DIAGNOSIS — E78.5 HYPERLIPIDEMIA, UNSPECIFIED HYPERLIPIDEMIA TYPE: Primary | Chronic | ICD-10-CM

## 2019-10-14 RX ORDER — ROSUVASTATIN CALCIUM 10 MG/1
TABLET, COATED ORAL
Qty: 90 TABLET | Refills: 3 | Status: SHIPPED | OUTPATIENT
Start: 2019-10-14 | End: 2020-10-06

## 2019-11-22 RX ORDER — TAMSULOSIN HYDROCHLORIDE 0.4 MG/1
CAPSULE ORAL
Qty: 90 CAPSULE | Refills: 3 | Status: SHIPPED | OUTPATIENT
Start: 2019-11-22 | End: 2021-01-21

## 2019-11-25 ENCOUNTER — OFFICE VISIT (OUTPATIENT)
Dept: INTERNAL MEDICINE | Age: 59
End: 2019-11-25

## 2019-11-25 VITALS
BODY MASS INDEX: 40.66 KG/M2 | TEMPERATURE: 97.3 F | DIASTOLIC BLOOD PRESSURE: 96 MMHG | HEART RATE: 78 BPM | HEIGHT: 66 IN | OXYGEN SATURATION: 96 % | SYSTOLIC BLOOD PRESSURE: 134 MMHG | WEIGHT: 253 LBS

## 2019-11-25 DIAGNOSIS — E66.01 MORBID OBESITY (HCC): Chronic | ICD-10-CM

## 2019-11-25 DIAGNOSIS — I10 ESSENTIAL HYPERTENSION: Primary | ICD-10-CM

## 2019-11-25 DIAGNOSIS — E78.5 HYPERLIPIDEMIA, UNSPECIFIED HYPERLIPIDEMIA TYPE: Chronic | ICD-10-CM

## 2019-11-25 DIAGNOSIS — G47.33 OSA (OBSTRUCTIVE SLEEP APNEA): Chronic | ICD-10-CM

## 2019-11-25 PROCEDURE — 99214 OFFICE O/P EST MOD 30 MIN: CPT | Performed by: INTERNAL MEDICINE

## 2019-11-25 RX ORDER — HYDROCHLOROTHIAZIDE 12.5 MG/1
12.5 TABLET ORAL DAILY
Qty: 30 TABLET | Refills: 3 | Status: SHIPPED | OUTPATIENT
Start: 2019-11-25 | End: 2020-02-19 | Stop reason: SDUPTHER

## 2019-11-25 NOTE — PATIENT INSTRUCTIONS
** IMPORTANT MESSAGE FROM DR. ROLDAN **    In our office, your satisfaction is VERY important to us.     You may receive a survey from [employer] by mail or E-mail for you to provide feedback about your visit. This information is invaluable for me to know what we can do to improve our services.     I ask that you please take a few minutes to complete the survey and let us know how we are doing in serving your needs. (You may receive the survey more than once for multiple visits)    Thank You !    Dr. Roldan & Staff    __________________________________________________________      ADDITIONAL INSTRUCTION / REMINDERS FROM DR. ROLDAN

## 2019-11-25 NOTE — ASSESSMENT & PLAN NOTE
Not able to tolerate CPAP (he did try again).   See sleep specialist.   Work on weight loss. Avoid alcohol.

## 2019-11-25 NOTE — ASSESSMENT & PLAN NOTE
Lab Results   Component Value Date    LDL 56 03/06/2019     (H) 01/04/2019     (H) 02/24/2017    HDL 55 03/06/2019    HDL 75 (H) 01/04/2019    HDL 69 (H) 02/24/2017    TRIG 96 03/06/2019

## 2019-11-25 NOTE — ASSESSMENT & PLAN NOTE
This is a new problem to this examiner.   · Start HCTZ 12.5 mg qAM.     Maintain low sodium diet of less than 3 gm.  Educational handout given.   Weight loss.   Sees sleep specialist about uncontrolled JULIO CESAR.     Follow-up in 3 months

## 2019-11-25 NOTE — ASSESSMENT & PLAN NOTE
BMI Readings from Last 3 Encounters:   11/25/19 40.86 kg/m²   05/23/19 39.72 kg/m²   01/04/19 40.24 kg/m²      Wt Readings from Last 3 Encounters:   11/25/19 115 kg (253 lb)   05/23/19 112 kg (246 lb)   01/04/19 113 kg (249 lb 3.2 oz)      Maintain a low sugar/starch/carbohydrate diet and exercise regularly. Wt loss advised.

## 2019-11-25 NOTE — PROGRESS NOTES
[facility] [place] [facility]  DENISA ROLDAN M.D.      Junior Estrada . / 59 y.o. / male  11/25/2019      CHIEF COMPLAINT     Hyperlipidemia (6 month f/u); Sleep Apnea; and Obesity      ASSESSMENT & PLAN     Problem List Items Addressed This Visit        High    Essential hypertension - Primary (Chronic)    Current Assessment & Plan     This is a new problem to this examiner.   · Start HCTZ 12.5 mg qAM.     Maintain low sodium diet of less than 3 gm.  Educational handout given.   Weight loss.   Sees sleep specialist about uncontrolled JULIO CESAR.     Follow-up in 3 months         Relevant Medications    hydroCHLOROthiazide (HYDRODIURIL) 12.5 MG tablet       Medium    Hyperlipidemia (Chronic)    Overview     Stable. Continue rosuvastatin 10 mg.          Current Assessment & Plan     Lab Results   Component Value Date    LDL 56 03/06/2019     (H) 01/04/2019     (H) 02/24/2017    HDL 55 03/06/2019    HDL 75 (H) 01/04/2019    HDL 69 (H) 02/24/2017    TRIG 96 03/06/2019             Relevant Medications    rosuvastatin (CRESTOR) 10 MG tablet    Morbid obesity (CMS/HCC) (Chronic)    Current Assessment & Plan     BMI Readings from Last 3 Encounters:   11/25/19 40.86 kg/m²   05/23/19 39.72 kg/m²   01/04/19 40.24 kg/m²      Wt Readings from Last 3 Encounters:   11/25/19 115 kg (253 lb)   05/23/19 112 kg (246 lb)   01/04/19 113 kg (249 lb 3.2 oz)      Maintain a low sugar/starch/carbohydrate diet and exercise regularly. Wt loss advised.           JULIO CESAR (obstructive sleep apnea) (Chronic)    Current Assessment & Plan     Not able to tolerate CPAP (he did try again).   See sleep specialist.   Work on weight loss. Avoid alcohol.              No orders of the defined types were placed in this encounter.    New Medications Ordered This Visit   Medications   • hydroCHLOROthiazide (HYDRODIURIL) 12.5 MG tablet     Sig: Take 1 tablet by mouth Daily.     Dispense:  30 tablet     Refill:  3       Summary/Discussion:  ·       Next  "Appointment with me: Visit date not found    Return in about 3 months (around 2/25/2020) for HYPERTENSION.      MEDICATIONS     Current Outpatient Medications   Medication Sig Dispense Refill   • azelastine (ASTELIN) 0.1 % nasal spray 2 sprays into the nostril(s) as directed by provider 2 (Two) Times a Day. Use in each nostril as directed 137 mL 5   • loratadine (CLARITIN) 10 MG tablet Take 10 mg by mouth Daily.     • omeprazole (priLOSEC) 20 MG capsule Take 1 capsule by mouth Daily. 90 capsule 1   • rosuvastatin (CRESTOR) 10 MG tablet TAKE 1 TABLET DAILY 90 tablet 3   • tamsulosin (FLOMAX) 0.4 MG capsule 24 hr capsule TAKE 1 CAPSULE EVERY NIGHT 90 capsule 3     No current facility-administered medications for this visit.           VITAL SIGNS     Visit Vitals  /96 (BP Location: Left arm, Patient Position: Sitting, Cuff Size: Large Adult)   Pulse 78   Temp 97.3 °F (36.3 °C)   Ht 167.6 cm (65.98\")   Wt 115 kg (253 lb)   SpO2 96%   BMI 40.86 kg/m²       BP Readings from Last 3 Encounters:   11/25/19 134/96   05/23/19 136/84   01/04/19 140/90     Wt Readings from Last 3 Encounters:   11/25/19 115 kg (253 lb)   05/23/19 112 kg (246 lb)   01/04/19 113 kg (249 lb 3.2 oz)      Body mass index is 40.86 kg/m².      HISTORY OF PRESENT ILLNESS     DBP remains high, + family history of hypertension. Denies headaches or chest pain  Has uncontrolled JULIO CESAR, retried CPAP without success. Has not returned to see sleep specialist.     Morbid Obesity: Weight has been increasing since last visit, has not been watching diet, has not made any significant dietary improvement.  Weight loss efforts: no specific plan(s).  Current Body mass index is 40.86 kg/m².   Wt Readings from Last 3 Encounters:   11/25/19 115 kg (253 lb)   05/23/19 112 kg (246 lb)   01/04/19 113 kg (249 lb 3.2 oz)      Chronic hyperlipidemia:  Current therapy include rosuvastatin, denies problems with medication.    Most recent labs:   Lab Results   Component Value " Date    LDL 56 03/06/2019     (H) 01/04/2019     (H) 02/24/2017    HDL 55 03/06/2019    HDL 75 (H) 01/04/2019    HDL 69 (H) 02/24/2017    TRIG 96 03/06/2019      Lab Results   Component Value Date    TESTOSTERONE 282.6 05/23/2019     Lab Results   Component Value Date    TESTFRE 3.2 (L) 05/23/2019     Lab Results   Component Value Date    PSA 0.548 01/04/2019    PSA 0.377 02/24/2017     Lab Results   Component Value Date    HCT 43.2 01/04/2019    HCT 43.1 02/24/2017        Patient Care Team:  Bill Torres MD as PCP - General (Internal Medicine)      REVIEW OF SYSTEMS     Review of Systems  Constitutional neg x weight gain   Resp neg for shortness of breath   CV neg chest pain, edema, palpitations   Neuro neg      PHYSICAL EXAMINATION     Physical Exam  Constitutional  No distress, obese   Cardiovascular Rate  normal . Rhythm: regular . Heart sounds:  Normal  Psychiatric  Alert. Judgment intact. Thought content normal. Mood normal      REVIEWED DATA     Labs:     Lab Results   Component Value Date     01/04/2019    K 4.2 01/04/2019    CALCIUM 9.6 01/04/2019    AST 15 03/06/2019    ALT 20 03/06/2019    BUN 20 01/04/2019    CREATININE 1.06 01/04/2019    CREATININE 1.17 02/24/2017    EGFRIFNONA 72 01/04/2019    EGFRIFAFRI 87 01/04/2019       Lab Results   Component Value Date    HGBA1C 5.50 05/23/2019    HGBA1C 5.30 01/04/2019    HGBA1C 5.30 02/24/2017    GLU 92 01/04/2019    GLU 98 02/24/2017    MICROALBUR <3.0 02/24/2017       Lab Results   Component Value Date    LDL 56 03/06/2019     (H) 01/04/2019     (H) 02/24/2017    HDL 55 03/06/2019    HDL 75 (H) 01/04/2019    HDL 69 (H) 02/24/2017    TRIG 96 03/06/2019    TRIG 115 01/04/2019    TRIG 166 (H) 02/24/2017       Lab Results   Component Value Date    TSH 2.010 05/23/2019    FREET4 1.02 05/23/2019       Lab Results   Component Value Date    WBC 5.21 01/04/2019    HGB 14.8 01/04/2019    HGB 14.5 02/24/2017     01/04/2019        Lab Results   Component Value Date    PROTEIN Negative 01/04/2019    GLUCOSEU Negative 01/04/2019    BLOODU Negative 01/04/2019    NITRITEU Negative 01/04/2019    LEUKOCYTESUR Negative 01/04/2019       Imaging:         Medical Tests:         Summary of old records / correspondence / consultant report:         Request outside records:         ALLERGIES  Allergies   Allergen Reactions   • No Known Drug Allergy Other (See Comments)     none        PFSH:     The following portions of the patient's history were reviewed and updated as appropriate: Allergies / Current Medications / Past Medical History / Surgical History / Social History / Family History    PROBLEM LIST   Patient Active Problem List   Diagnosis   • Tubular adenoma of colon   • Gastroesophageal reflux disease without esophagitis   • Hyperlipidemia   • Benign prostatic hyperplasia with lower urinary tract symptoms   • Morbid obesity (CMS/[facility])   • JULIO CESAR (obstructive sleep apnea)   • Allergic rhinitis due to pollen   • Essential hypertension       PAST MEDICAL HISTORY  Past Medical History:   Diagnosis Date   • Cataract     A small cataract has been noted on my left eye since 1987   • Diabetes mellitus (CMS/[facility]) 2008    Elevated blood sugar noted on blood test.  Monitored and rem   • Elevated glucose     Controlled with diet   • GERD (gastroesophageal reflux disease) 2018   • History of skin cancer    • Seasonal allergies        SURGICAL HISTORY  Past Surgical History:   Procedure Laterality Date   • CARPAL TUNNEL RELEASE     • COLONOSCOPY N/A 5/8/2017    Procedure: COLONOSCOPY into cecum with cold snare polypectomy;  Surgeon: Emmanuel Ayala MD;  Location: [facility] ENDOSCOPY;  Service:    • NASAL POLYP EXCISION     • SKIN CANCER EXCISION         SOCIAL HISTORY  Social History     Socioeconomic History   • Marital status: Single     Spouse name: Not on file   • Number of children: 0   • Years of education: Not on file   • Highest education level: Not on  file   Occupational History   • Occupation:  ([employer])   Tobacco Use   • Smoking status: Former Smoker     Packs/day: 0.50     Years: 20.00     Pack years: 10.00     Last attempt to quit: 2000     Years since quittin.9   • Smokeless tobacco: Former User     Types: Snuff, Chew   Substance and Sexual Activity   • Alcohol use: Yes     Frequency: 2-3 times a week     Drinks per session: 1 or 2   • Drug use: No   • Sexual activity: No     Partners: Female     Comment: Abstinent for 20+ years   Lifestyle   • Physical activity:     Days per week: 5 days     Minutes per session: 20 min   • Stress: To some extent       FAMILY HISTORY  Family History   Problem Relation Age of Onset   • Dementia Mother 70   • Hypertension Mother    • Ulcers Father    • Liver cancer Paternal Grandmother    • No Known Problems Sister    • No Known Problems Brother    • Dementia Maternal Grandmother    • Prostate cancer Maternal Grandfather    • Obesity Sister    • No Known Problems Sister    • Prostate cancer Maternal Uncle    • Dementia Maternal Uncle         had MVA with head injury   • Colon cancer Neg Hx    • Colon polyps Neg Hx          **Dragon Disclaimer:   Much of this encounter note is an electronic transcription/translation of spoken language to printed text. The electronic translation of spoken language may permit erroneous, or at times, nonsensical words or phrases to be inadvertently transcribed. Although I have reviewed the note for such errors, some may still exist.       Template created by Trey Torres MD

## 2019-12-13 DIAGNOSIS — K21.9 GASTROESOPHAGEAL REFLUX DISEASE WITHOUT ESOPHAGITIS: Primary | ICD-10-CM

## 2019-12-13 RX ORDER — OMEPRAZOLE 20 MG/1
CAPSULE, DELAYED RELEASE ORAL
Qty: 90 CAPSULE | Refills: 3 | Status: SHIPPED | OUTPATIENT
Start: 2019-12-13 | End: 2020-11-29

## 2020-02-19 DIAGNOSIS — I10 ESSENTIAL HYPERTENSION: ICD-10-CM

## 2020-02-19 RX ORDER — HYDROCHLOROTHIAZIDE 12.5 MG/1
12.5 TABLET ORAL DAILY
Qty: 90 TABLET | Refills: 3 | Status: SHIPPED | OUTPATIENT
Start: 2020-02-19 | End: 2021-01-21

## 2020-02-25 ENCOUNTER — OFFICE VISIT (OUTPATIENT)
Dept: INTERNAL MEDICINE | Age: 60
End: 2020-02-25

## 2020-02-25 VITALS
BODY MASS INDEX: 38.89 KG/M2 | DIASTOLIC BLOOD PRESSURE: 88 MMHG | WEIGHT: 242 LBS | TEMPERATURE: 97 F | HEART RATE: 70 BPM | HEIGHT: 66 IN | OXYGEN SATURATION: 97 % | SYSTOLIC BLOOD PRESSURE: 122 MMHG

## 2020-02-25 DIAGNOSIS — E66.01 MORBID OBESITY (HCC): Chronic | ICD-10-CM

## 2020-02-25 DIAGNOSIS — E78.5 HYPERLIPIDEMIA, UNSPECIFIED HYPERLIPIDEMIA TYPE: Chronic | ICD-10-CM

## 2020-02-25 DIAGNOSIS — I10 ESSENTIAL HYPERTENSION: Primary | Chronic | ICD-10-CM

## 2020-02-25 LAB
ALBUMIN SERPL-MCNC: 4.2 G/DL (ref 3.5–5.2)
ALBUMIN/GLOB SERPL: 1.5 G/DL
ALP SERPL-CCNC: 67 U/L (ref 39–117)
ALT SERPL-CCNC: 22 U/L (ref 1–41)
AST SERPL-CCNC: 18 U/L (ref 1–40)
BILIRUB SERPL-MCNC: 0.5 MG/DL (ref 0.2–1.2)
BUN SERPL-MCNC: 13 MG/DL (ref 6–20)
BUN/CREAT SERPL: 11.9 (ref 7–25)
CALCIUM SERPL-MCNC: 9.1 MG/DL (ref 8.6–10.5)
CHLORIDE SERPL-SCNC: 100 MMOL/L (ref 98–107)
CHOLEST SERPL-MCNC: 120 MG/DL (ref 0–200)
CHOLEST/HDLC SERPL: 2.55 {RATIO}
CO2 SERPL-SCNC: 23.5 MMOL/L (ref 22–29)
CREAT SERPL-MCNC: 1.09 MG/DL (ref 0.76–1.27)
GLOBULIN SER CALC-MCNC: 2.8 GM/DL
GLUCOSE SERPL-MCNC: 104 MG/DL (ref 65–99)
HBA1C MFR BLD: 5.8 % (ref 4.8–5.6)
HDLC SERPL-MCNC: 47 MG/DL (ref 40–60)
LDLC SERPL CALC-MCNC: 30 MG/DL (ref 0–100)
POTASSIUM SERPL-SCNC: 4.1 MMOL/L (ref 3.5–5.2)
PROT SERPL-MCNC: 7 G/DL (ref 6–8.5)
SODIUM SERPL-SCNC: 137 MMOL/L (ref 136–145)
TRIGL SERPL-MCNC: 216 MG/DL (ref 0–150)
VLDLC SERPL CALC-MCNC: 43.2 MG/DL

## 2020-02-25 PROCEDURE — 99214 OFFICE O/P EST MOD 30 MIN: CPT | Performed by: INTERNAL MEDICINE

## 2020-02-25 NOTE — ASSESSMENT & PLAN NOTE
· The following were discussed: low calorie, low carb based diet program, portion control, make dinner lightest meal of day, avoid eating sweets, desserts, and excess amount of fruits and increase physical activity, monitor steps taken daily   · Goal weight loss of 10-15 lbs discussed.

## 2020-02-25 NOTE — PROGRESS NOTES
[facility] [place] INTERNAL MEDICINE  DENISA ROLDAN M.D.      Junior ISAI Nikkiingris [place]. / 59 y.o. / male  02/25/2020      CHIEF COMPLAINT     Hypertension      ASSESSMENT & PLAN     Problem List Items Addressed This Visit        High    Essential hypertension - Primary (Chronic)    Current Assessment & Plan     Improved. Continue HCTZ 12.5 mg qd.     BP Readings from Last 3 Encounters:   02/25/20 122/88   11/25/19 134/96   05/23/19 136/84             Relevant Medications    hydroCHLOROthiazide (HYDRODIURIL) 12.5 MG tablet    Other Relevant Orders    Comprehensive Metabolic Panel       Medium    Hyperlipidemia (Chronic)    Current Assessment & Plan     Continue rosuvastatin 10 mg.          Relevant Medications    rosuvastatin (CRESTOR) 10 MG tablet    Other Relevant Orders    Lipid Panel With / Chol / HDL Ratio    Comprehensive Metabolic Panel    Morbid obesity (CMS/HCC) (Chronic)    Current Assessment & Plan     · The following were discussed: low calorie, low carb based diet program, portion control, make dinner lightest meal of day, avoid eating sweets, desserts, and excess amount of fruits and increase physical activity, monitor steps taken daily   · Goal weight loss of 10-15 lbs discussed.             Relevant Orders    Hemoglobin A1c        Orders Placed This Encounter   Procedures   • Lipid Panel With / Chol / HDL Ratio   • Comprehensive Metabolic Panel   • Hemoglobin A1c     No orders of the defined types were placed in this encounter.      Summary/Discussion:        Next Appointment with me: Visit date not found    Return in about 4 months (around 6/25/2020) for Reassess chronic medical problems.      MEDICATIONS     Current Outpatient Medications   Medication Sig Dispense Refill   • hydroCHLOROthiazide (HYDRODIURIL) 12.5 MG tablet Take 1 tablet by mouth Daily. 90 tablet 3   • loratadine (CLARITIN) 10 MG tablet Take 10 mg by mouth Daily.     • omeprazole (priLOSEC) 20 MG capsule TAKE 1 CAPSULE DAILY 90 capsule 3   • rosuvastatin  "(CRESTOR) 10 MG tablet TAKE 1 TABLET DAILY 90 tablet 3   • tamsulosin (FLOMAX) 0.4 MG capsule 24 hr capsule TAKE 1 CAPSULE EVERY NIGHT 90 capsule 3   • azelastine (ASTELIN) 0.1 % nasal spray 2 sprays into the nostril(s) as directed by provider 2 (Two) Times a Day. Use in each nostril as directed 137 mL 5     No current facility-administered medications for this visit.           VITAL SIGNS     Visit Vitals  /88   Pulse 70   Temp 97 °F (36.1 °C)   Ht 167.6 cm (65.98\")   Wt 110 kg (242 lb)   SpO2 97%   BMI 39.08 kg/m²       BP Readings from Last 3 Encounters:   02/25/20 122/88   11/25/19 134/96   05/23/19 136/84     Wt Readings from Last 3 Encounters:   02/25/20 110 kg (242 lb)   11/25/19 115 kg (253 lb)   05/23/19 112 kg (246 lb)      Body mass index is 39.08 kg/m².      HISTORY OF PRESENT ILLNESS     Hypertension: blood pressure is improved with start of HCTZ without problems.   Hyperlipidemia: on rosuvastatin 10 mg without problems.   Lab Results   Component Value Date    LDL 56 03/06/2019     (H) 01/04/2019     (H) 02/24/2017    HDL 55 03/06/2019      Morbid obesity: has reduced alcohol and watching diet better but frustrated because he cannot eat what he wants to eat.   BMI Readings from Last 3 Encounters:   02/25/20 39.08 kg/m²   11/25/19 40.86 kg/m²   05/23/19 39.72 kg/m²      Wt Readings from Last 3 Encounters:   02/25/20 110 kg (242 lb)   11/25/19 115 kg (253 lb)   05/23/19 112 kg (246 lb)          Patient Care Team:  Bill Torres MD as PCP - General (Internal Medicine)      REVIEW OF SYSTEMS     Review of Systems  Constitutional neg x intended wt loss   Resp on CPAP   CV neg  Neuro neg     PHYSICAL EXAMINATION     Physical Exam  Constitutional  No distress, mild weight loss noted   Cardiovascular Rate  normal . Rhythm: regular . Heart sounds:  Normal  Pulmonary/Chest: Effort normal and breath sounds normal.    Psychiatric  Alert. Judgment intact. Thought content normal. Mood " normal    REVIEWED DATA     Labs:     Lab Results   Component Value Date     01/04/2019    K 4.2 01/04/2019    CALCIUM 9.6 01/04/2019    AST 15 03/06/2019    ALT 20 03/06/2019    BUN 20 01/04/2019    CREATININE 1.06 01/04/2019    CREATININE 1.17 02/24/2017    EGFRIFNONA 72 01/04/2019    EGFRIFAFRI 87 01/04/2019       Lab Results   Component Value Date    HGBA1C 5.50 05/23/2019    HGBA1C 5.30 01/04/2019    HGBA1C 5.30 02/24/2017    GLU 92 01/04/2019    GLU 98 02/24/2017    MICROALBUR <3.0 02/24/2017       Lab Results   Component Value Date    LDL 56 03/06/2019     (H) 01/04/2019     (H) 02/24/2017    HDL 55 03/06/2019    HDL 75 (H) 01/04/2019    HDL 69 (H) 02/24/2017    TRIG 96 03/06/2019    TRIG 115 01/04/2019    TRIG 166 (H) 02/24/2017       Lab Results   Component Value Date    TSH 2.010 05/23/2019    FREET4 1.02 05/23/2019       Lab Results   Component Value Date    WBC 5.21 01/04/2019    HGB 14.8 01/04/2019    HGB 14.5 02/24/2017     01/04/2019       Lab Results   Component Value Date    PROTEIN Negative 01/04/2019    GLUCOSEU Negative 01/04/2019    BLOODU Negative 01/04/2019    NITRITEU Negative 01/04/2019    LEUKOCYTESUR Negative 01/04/2019       Imaging:         Medical Tests:         Summary of old records / correspondence / consultant report:         Request outside records:         ALLERGIES  Allergies   Allergen Reactions   • No Known Drug Allergy Other (See Comments)     none        PFSH:     The following portions of the patient's history were reviewed and updated as appropriate: Allergies / Current Medications / Past Medical History / Surgical History / Social History / Family History    PROBLEM LIST   Patient Active Problem List   Diagnosis   • Tubular adenoma of colon   • Gastroesophageal reflux disease without esophagitis   • Hyperlipidemia   • Benign prostatic hyperplasia with lower urinary tract symptoms   • Morbid obesity (CMS/HCC)   • JULIO CESAR (obstructive sleep apnea)   •  Allergic rhinitis due to pollen   • Essential hypertension       PAST MEDICAL HISTORY  Past Medical History:   Diagnosis Date   • Cataract     A small cataract has been noted on my left eye since    • Diabetes mellitus (CMS/HCC)     Elevated blood sugar noted on blood test.  Monitored and rem   • GERD (gastroesophageal reflux disease)    • History of skin cancer    • Seasonal allergies        SURGICAL HISTORY  Past Surgical History:   Procedure Laterality Date   • CARPAL TUNNEL RELEASE     • COLONOSCOPY N/A 2017    Procedure: COLONOSCOPY into cecum with cold snare polypectomy;  Surgeon: Emmanuel Ayala MD;  Location: [facility] ENDOSCOPY;  Service:    • NASAL POLYP EXCISION     • SKIN CANCER EXCISION         SOCIAL HISTORY  Social History     Socioeconomic History   • Marital status: Single     Spouse name: Not on file   • Number of children: 0   • Years of education: Not on file   • Highest education level: Not on file   Occupational History   • Occupation:  ([employer])   Tobacco Use   • Smoking status: Former Smoker     Packs/day: 0.50     Years: 20.00     Pack years: 10.00     Last attempt to quit: 2000     Years since quittin.2   • Smokeless tobacco: Former User     Types: Snuff, Chew   Substance and Sexual Activity   • Alcohol use: Yes     Frequency: 2-3 times a week     Drinks per session: 1 or 2   • Drug use: No   • Sexual activity: Never     Partners: Female     Comment: Abstinent for 20+ years   Lifestyle   • Physical activity:     Days per week: 5 days     Minutes per session: 20 min   • Stress: To some extent       FAMILY HISTORY  Family History   Problem Relation Age of Onset   • Dementia Mother 70   • Hypertension Mother    • Ulcers Father    • Liver cancer Paternal Grandmother    • No Known Problems Sister    • No Known Problems Brother    • Dementia Maternal Grandmother    • Prostate cancer Maternal Grandfather    • Obesity Sister    • No Known Problems Sister     • Prostate cancer Maternal Uncle    • Dementia Maternal Uncle         had MVA with head injury   • Colon cancer Neg Hx    • Colon polyps Neg Hx    • Diabetes Neg Hx          **Dragon Disclaimer:   Much of this encounter note is an electronic transcription/translation of spoken language to printed text. The electronic translation of spoken language may permit erroneous, or at times, nonsensical words or phrases to be inadvertently transcribed. Although I have reviewed the note for such errors, some may still exist.       Template created by Trey Torres MD

## 2020-02-25 NOTE — ASSESSMENT & PLAN NOTE
Improved. Continue HCTZ 12.5 mg qd.     BP Readings from Last 3 Encounters:   02/25/20 122/88   11/25/19 134/96   05/23/19 136/84

## 2020-02-25 NOTE — PATIENT INSTRUCTIONS
** IMPORTANT MESSAGE FROM DR. ROLDAN **    In our office, your satisfaction is VERY important to us.     You may receive a survey from [employer] [facility] by mail or E-mail for you to provide feedback about your visit. This information is invaluable for me to know what we can do to improve our services.     I ask that you please take a few minutes to complete the survey and let us know how we are doing in serving your needs. (You may receive the survey more than once for multiple visits)    Thank You !    Dr. Roldan & Staff    _________________________________________________________________________________________________________________________      ** ADDITIONAL INSTRUCTION / REMINDERS FROM DR. ROLDAN **

## 2020-07-10 ENCOUNTER — OFFICE VISIT (OUTPATIENT)
Dept: INTERNAL MEDICINE | Age: 60
End: 2020-07-10

## 2020-07-10 VITALS
OXYGEN SATURATION: 99 % | WEIGHT: 234 LBS | BODY MASS INDEX: 37.61 KG/M2 | HEART RATE: 84 BPM | DIASTOLIC BLOOD PRESSURE: 86 MMHG | SYSTOLIC BLOOD PRESSURE: 126 MMHG | TEMPERATURE: 97.1 F | HEIGHT: 66 IN

## 2020-07-10 DIAGNOSIS — L73.9 FOLLICULITIS: ICD-10-CM

## 2020-07-10 DIAGNOSIS — N40.1 BENIGN PROSTATIC HYPERPLASIA WITH LOWER URINARY TRACT SYMPTOMS, SYMPTOM DETAILS UNSPECIFIED: Chronic | ICD-10-CM

## 2020-07-10 DIAGNOSIS — E66.01 MORBID OBESITY (HCC): Chronic | ICD-10-CM

## 2020-07-10 DIAGNOSIS — K21.9 GASTROESOPHAGEAL REFLUX DISEASE WITHOUT ESOPHAGITIS: Chronic | ICD-10-CM

## 2020-07-10 DIAGNOSIS — I10 ESSENTIAL HYPERTENSION: Primary | Chronic | ICD-10-CM

## 2020-07-10 DIAGNOSIS — E78.5 HYPERLIPIDEMIA, UNSPECIFIED HYPERLIPIDEMIA TYPE: Chronic | ICD-10-CM

## 2020-07-10 DIAGNOSIS — R73.03 PREDIABETES: ICD-10-CM

## 2020-07-10 LAB — HBA1C MFR BLD: 5.6 % (ref 4.8–5.6)

## 2020-07-10 PROCEDURE — 99214 OFFICE O/P EST MOD 30 MIN: CPT | Performed by: INTERNAL MEDICINE

## 2020-07-10 RX ORDER — CLINDAMYCIN PHOSPHATE 11.9 MG/ML
SOLUTION TOPICAL 2 TIMES DAILY
Qty: 60 ML | Refills: 2 | Status: SHIPPED | OUTPATIENT
Start: 2020-07-10 | End: 2021-01-13

## 2020-07-10 RX ORDER — DOXYCYCLINE HYCLATE 100 MG
100 TABLET ORAL 2 TIMES DAILY
Qty: 20 TABLET | Refills: 0 | Status: SHIPPED | OUTPATIENT
Start: 2020-07-10 | End: 2020-07-20

## 2020-07-10 NOTE — ASSESSMENT & PLAN NOTE
Along posterior hairline/scalp   Doxycycline 100 mg BID x 10 days  Clindamycin topical solution BID as needed

## 2020-07-10 NOTE — PROGRESS NOTES
[facility] [place]  DENISA ROLDAN M.D.      Junior ISAI Nikkiingris [place]. / 59 y.o. / male  07/10/2020      CHIEF COMPLAINT     Hypertension; Hyperlipidemia; and Obesity      ASSESSMENT & PLAN     Problem List Items Addressed This Visit        High    Essential hypertension - Primary (Chronic)    Current Assessment & Plan     Continue HCTZ 12.5 mg qd         Relevant Medications    hydroCHLOROthiazide (HYDRODIURIL) 12.5 MG tablet       Medium    Hyperlipidemia (Chronic)    Current Assessment & Plan     Continue rosuvastatin 10 mg.          Relevant Medications    rosuvastatin (CRESTOR) 10 MG tablet    Morbid obesity (CMS/HCC) (Chronic)    Prediabetes (Chronic)    Current Assessment & Plan     Check A1c.   Good wt loss with dietary modifications.     Lab Results   Component Value Date     (H) 02/25/2020    GLU 92 01/04/2019    GLU 98 02/24/2017     Lab Results   Component Value Date    HGBA1C 5.80 (H) 02/25/2020    HGBA1C 5.50 05/23/2019    HGBA1C 5.30 01/04/2019             Relevant Orders    Hemoglobin A1c       Low    Gastroesophageal reflux disease without esophagitis (Chronic)    Current Assessment & Plan     Continue omeprazole 20 mg qd.          Relevant Medications    omeprazole (priLOSEC) 20 MG capsule    Benign prostatic hyperplasia with lower urinary tract symptoms (Chronic)    Current Assessment & Plan     Continue tamsulosin 0.4 mg qHS.          Folliculitis    Current Assessment & Plan     Along posterior hairline/scalp   Doxycycline 100 mg BID x 10 days  Clindamycin topical solution BID as needed          Relevant Medications    doxycycline (VIBRAMYICN) 100 MG tablet    clindamycin (Cleocin-T) 1 % external solution        Orders Placed This Encounter   Procedures   • Hemoglobin A1c     New Medications Ordered This Visit   Medications   • doxycycline (VIBRAMYICN) 100 MG tablet     Sig: Take 1 tablet by mouth 2 (Two) Times a Day for 10 days.     Dispense:  20 tablet     Refill:  0   • clindamycin  "(Cleocin-T) 1 % external solution     Sig: Apply  topically to the appropriate area as directed 2 (Two) Times a Day.     Dispense:  60 mL     Refill:  2       Summary/Discussion:  • Patient has been erroneously marked as diabetic. Based on the available clinical information, he does not have diabetes and should therefore be excluded from diabetic health maintenance and quality measures for the remainder of the reporting period.     Advance Care Planning        Next Appointment with me: Visit date not found    Return in about 6 months (around 1/10/2021) for Hypertension, Hyperlipidemia, Prediabetes.      MEDICATIONS     Current Outpatient Medications   Medication Sig Dispense Refill   • hydroCHLOROthiazide (HYDRODIURIL) 12.5 MG tablet Take 1 tablet by mouth Daily. 90 tablet 3   • loratadine (CLARITIN) 10 MG tablet Take 10 mg by mouth Daily.     • omeprazole (priLOSEC) 20 MG capsule TAKE 1 CAPSULE DAILY 90 capsule 3   • rosuvastatin (CRESTOR) 10 MG tablet TAKE 1 TABLET DAILY 90 tablet 3   • tamsulosin (FLOMAX) 0.4 MG capsule 24 hr capsule TAKE 1 CAPSULE EVERY NIGHT 90 capsule 3         VITAL SIGNS     Visit Vitals  /86   Pulse 84   Temp 97.1 °F (36.2 °C)   Ht 167.6 cm (65.98\")   Wt 106 kg (234 lb)   SpO2 99%   BMI 37.79 kg/m²       BP Readings from Last 3 Encounters:   07/10/20 126/86   02/25/20 122/88   11/25/19 134/96     Wt Readings from Last 3 Encounters:   07/10/20 106 kg (234 lb)   02/25/20 110 kg (242 lb)   11/25/19 115 kg (253 lb)      Body mass index is 37.79 kg/m².      HISTORY OF PRESENT ILLNESS     Continued intended wt loss with dietary modifications.   Prediabetes:   Lab Results   Component Value Date     (H) 02/25/2020    GLU 92 01/04/2019    GLU 98 02/24/2017     Lab Results   Component Value Date    HGBA1C 5.80 (H) 02/25/2020    HGBA1C 5.50 05/23/2019    HGBA1C 5.30 01/04/2019      Hypertension remains well controlled with HCTZ     Hyperlipidemia is stable on rosuvastatin without " "problems      Patient Care Team:  Bill Torres MD as PCP - General (Internal Medicine)      REVIEW OF SYSTEMS     Review of Systems  Intended wt loss   No chest pain or shortness of breath  GERD stable  No headaches   Recurrent \"acne\" on back of head       PHYSICAL EXAMINATION     Physical Exam  Noted wt losartan  Cardiovascular: Normal rate, regular rhythm and normal heart sounds.   Pulmonary/Chest: Effort normal and breath sounds normal.    Folliculitis of posterior neck/hairline     REVIEWED DATA     Labs:     Lab Results   Component Value Date     02/25/2020    K 4.1 02/25/2020    CALCIUM 9.1 02/25/2020    AST 18 02/25/2020    ALT 22 02/25/2020    BUN 13 02/25/2020    CREATININE 1.09 02/25/2020    CREATININE 1.06 01/04/2019    CREATININE 1.17 02/24/2017    EGFRIFNONA 69 02/25/2020    EGFRIFAFRI 84 02/25/2020       Lab Results   Component Value Date    HGBA1C 5.80 (H) 02/25/2020    HGBA1C 5.50 05/23/2019    HGBA1C 5.30 01/04/2019     (H) 02/25/2020    GLU 92 01/04/2019    GLU 98 02/24/2017    MICROALBUR <3.0 02/24/2017       Lab Results   Component Value Date    LDL 30 02/25/2020    LDL 56 03/06/2019     (H) 01/04/2019    HDL 47 02/25/2020    HDL 55 03/06/2019    HDL 75 (H) 01/04/2019    TRIG 216 (H) 02/25/2020    TRIG 96 03/06/2019    TRIG 115 01/04/2019    CHOLHDLRATIO 2.55 02/25/2020       Lab Results   Component Value Date    TSH 2.010 05/23/2019    FREET4 1.02 05/23/2019       Lab Results   Component Value Date    WBC 5.21 01/04/2019    HGB 14.8 01/04/2019    HGB 14.5 02/24/2017     01/04/2019       Lab Results   Component Value Date    PROTEIN Negative 01/04/2019    GLUCOSEU Negative 01/04/2019    BLOODU Negative 01/04/2019    NITRITEU Negative 01/04/2019    LEUKOCYTESUR Negative 01/04/2019       Imaging:         Medical Tests:         Summary of old records / correspondence / consultant report:         Request outside records:         ALLERGIES  Allergies   Allergen Reactions   • " No Known Drug Allergy Other (See Comments)     none        PFSH:     The following portions of the patient's history were reviewed and updated as appropriate: Allergies / Current Medications / Past Medical History / Surgical History / Social History / Family History    PROBLEM LIST   Patient Active Problem List   Diagnosis   • Tubular adenoma of colon   • Gastroesophageal reflux disease without esophagitis   • Hyperlipidemia   • Benign prostatic hyperplasia with lower urinary tract symptoms   • Morbid obesity (CMS/HCC)   • JULIO CESAR (obstructive sleep apnea)   • Allergic rhinitis due to pollen   • Essential hypertension   • Prediabetes   • Folliculitis       PAST MEDICAL HISTORY  Past Medical History:   Diagnosis Date   • Cataract     A small cataract has been noted on my left eye since    • Diabetes mellitus (CMS/HCC)     Elevated blood sugar noted on blood test.  Monitored and rem   • GERD (gastroesophageal reflux disease)    • History of skin cancer    • Seasonal allergies        SURGICAL HISTORY  Past Surgical History:   Procedure Laterality Date   • CARPAL TUNNEL RELEASE     • COLONOSCOPY N/A 2017    Procedure: COLONOSCOPY into cecum with cold snare polypectomy;  Surgeon: Emmanuel Ayala MD;  Location: [facility] ENDOSCOPY;  Service:    • NASAL POLYP EXCISION     • SKIN CANCER EXCISION         SOCIAL HISTORY  Social History     Socioeconomic History   • Marital status: Single     Spouse name: Not on file   • Number of children: 0   • Years of education: Not on file   • Highest education level: Not on file   Occupational History   • Occupation:  ([employer])   Tobacco Use   • Smoking status: Former Smoker     Packs/day: 0.50     Years: 20.00     Pack years: 10.00     Last attempt to quit: 2000     Years since quittin.6   • Smokeless tobacco: Former User     Types: Snuff, Chew   Substance and Sexual Activity   • Alcohol use: Yes     Frequency: 2-3 times a week     Drinks per  session: 1 or 2   • Drug use: No   • Sexual activity: Never     Partners: Female     Comment: Abstinent for 20+ years   Lifestyle   • Physical activity:     Days per week: 5 days     Minutes per session: 20 min   • Stress: To some extent       FAMILY HISTORY  Family History   Problem Relation Age of Onset   • Dementia Mother 70   • Hypertension Mother    • Ulcers Father    • Liver cancer Paternal Grandmother    • No Known Problems Sister    • No Known Problems Brother    • Dementia Maternal Grandmother    • Prostate cancer Maternal Grandfather    • Obesity Sister    • No Known Problems Sister    • Prostate cancer Maternal Uncle    • Dementia Maternal Uncle         had MVA with head injury   • Colon cancer Neg Hx    • Colon polyps Neg Hx    • Diabetes Neg Hx          **Dragon Disclaimer:   Much of this encounter note is an electronic transcription/translation of spoken language to printed text. The electronic translation of spoken language may permit erroneous, or at times, nonsensical words or phrases to be inadvertently transcribed. Although I have reviewed the note for such errors, some may still exist.     Template created by Trey Torres MD

## 2020-07-10 NOTE — ASSESSMENT & PLAN NOTE
Check A1c.   Good wt loss with dietary modifications.     Lab Results   Component Value Date     (H) 02/25/2020    GLU 92 01/04/2019    GLU 98 02/24/2017     Lab Results   Component Value Date    HGBA1C 5.80 (H) 02/25/2020    HGBA1C 5.50 05/23/2019    HGBA1C 5.30 01/04/2019

## 2020-10-06 DIAGNOSIS — E78.5 HYPERLIPIDEMIA, UNSPECIFIED HYPERLIPIDEMIA TYPE: Chronic | ICD-10-CM

## 2020-10-06 RX ORDER — ROSUVASTATIN CALCIUM 10 MG/1
TABLET, COATED ORAL
Qty: 90 TABLET | Refills: 3 | Status: SHIPPED | OUTPATIENT
Start: 2020-10-06 | End: 2021-10-07

## 2020-11-29 DIAGNOSIS — K21.9 GASTROESOPHAGEAL REFLUX DISEASE WITHOUT ESOPHAGITIS: ICD-10-CM

## 2020-11-29 RX ORDER — OMEPRAZOLE 20 MG/1
CAPSULE, DELAYED RELEASE ORAL
Qty: 90 CAPSULE | Refills: 3 | Status: SHIPPED | OUTPATIENT
Start: 2020-11-29 | End: 2021-11-09

## 2021-01-13 ENCOUNTER — OFFICE VISIT (OUTPATIENT)
Dept: INTERNAL MEDICINE | Age: 61
End: 2021-01-13

## 2021-01-13 VITALS
DIASTOLIC BLOOD PRESSURE: 86 MMHG | OXYGEN SATURATION: 99 % | WEIGHT: 240 LBS | BODY MASS INDEX: 38.57 KG/M2 | HEART RATE: 80 BPM | TEMPERATURE: 96.4 F | HEIGHT: 66 IN | SYSTOLIC BLOOD PRESSURE: 132 MMHG

## 2021-01-13 DIAGNOSIS — E78.5 HYPERLIPIDEMIA, UNSPECIFIED HYPERLIPIDEMIA TYPE: Chronic | ICD-10-CM

## 2021-01-13 DIAGNOSIS — R73.03 PREDIABETES: Chronic | ICD-10-CM

## 2021-01-13 DIAGNOSIS — I10 ESSENTIAL HYPERTENSION: Primary | Chronic | ICD-10-CM

## 2021-01-13 LAB
ALBUMIN SERPL-MCNC: 4.3 G/DL (ref 3.5–5.2)
ALBUMIN/GLOB SERPL: 1.7 G/DL
ALP SERPL-CCNC: 52 U/L (ref 39–117)
ALT SERPL-CCNC: 15 U/L (ref 1–41)
AST SERPL-CCNC: 20 U/L (ref 1–40)
BILIRUB SERPL-MCNC: 0.8 MG/DL (ref 0–1.2)
BUN SERPL-MCNC: 16 MG/DL (ref 8–23)
BUN/CREAT SERPL: 11.9 (ref 7–25)
CALCIUM SERPL-MCNC: 9.4 MG/DL (ref 8.6–10.5)
CHLORIDE SERPL-SCNC: 102 MMOL/L (ref 98–107)
CHOLEST SERPL-MCNC: 129 MG/DL (ref 0–200)
CHOLEST/HDLC SERPL: 2.02 {RATIO}
CO2 SERPL-SCNC: 27 MMOL/L (ref 22–29)
CREAT SERPL-MCNC: 1.35 MG/DL (ref 0.76–1.27)
GLOBULIN SER CALC-MCNC: 2.5 GM/DL
GLUCOSE SERPL-MCNC: 97 MG/DL (ref 65–99)
HBA1C MFR BLD: 5.6 % (ref 4.8–5.6)
HDLC SERPL-MCNC: 64 MG/DL (ref 40–60)
LDLC SERPL CALC-MCNC: 47 MG/DL (ref 0–100)
POTASSIUM SERPL-SCNC: 4.4 MMOL/L (ref 3.5–5.2)
PROT SERPL-MCNC: 6.8 G/DL (ref 6–8.5)
SODIUM SERPL-SCNC: 138 MMOL/L (ref 136–145)
TRIGL SERPL-MCNC: 98 MG/DL (ref 0–150)
VLDLC SERPL CALC-MCNC: 18 MG/DL (ref 5–40)

## 2021-01-13 PROCEDURE — 99214 OFFICE O/P EST MOD 30 MIN: CPT | Performed by: INTERNAL MEDICINE

## 2021-01-13 NOTE — PROGRESS NOTES
"    I N T E R N A L  M E D I C I N E  J U N O H  K I M,  M D      ENCOUNTER DATE:  01/13/2021    Junior Rubiabbey [place]. / 60 y.o. / male      CHIEF COMPLAINT / REASON FOR OFFICE VISIT     Hypertension, Hyperlipidemia, and Prediabetes      ASSESSMENT & PLAN     Problem List Items Addressed This Visit     Essential hypertension - Primary (Chronic)    Current Assessment & Plan     Blood pressure little higher, increased edema of legs.   Maintain low sodium diet of less than 2.5 gm.    Continue HCTZ 12.5 mg qd.   Monitor home blood pressure closer.     BP Readings from Last 3 Encounters:   01/13/21 132/86   07/10/20 126/86   02/25/20 122/88             Relevant Medications    hydroCHLOROthiazide (HYDRODIURIL) 12.5 MG tablet    Other Relevant Orders    Comprehensive Metabolic Panel    Hyperlipidemia (Chronic)    Relevant Medications    rosuvastatin (CRESTOR) 10 MG tablet    Other Relevant Orders    Comprehensive Metabolic Panel    Lipid Panel With / Chol / HDL Ratio    Prediabetes (Chronic)    Current Assessment & Plan     Wt gain noted. Check A1c. Maintain a low sugar/starch/carbohydrate diet and exercise regularly. Wt loss advised.           Relevant Orders    Hemoglobin A1c        Orders Placed This Encounter   Procedures   • Comprehensive Metabolic Panel   • Hemoglobin A1c   • Lipid Panel With / Chol / HDL Ratio     No orders of the defined types were placed in this encounter.      SUMMARY/DISCUSSION  •       Next Appointment with me: Visit date not found    Return in about 4 months (around 5/13/2021) for Hypertension, Prediabetes, Hyperlipidemia.      VITAL SIGNS     Visit Vitals  /86   Pulse 80   Temp 96.4 °F (35.8 °C)   Ht 167.6 cm (65.98\")   Wt 109 kg (240 lb)   SpO2 99%   BMI 38.76 kg/m²       BP Readings from Last 3 Encounters:   01/13/21 132/86   07/10/20 126/86   02/25/20 122/88     Wt Readings from Last 3 Encounters:   01/13/21 109 kg (240 lb)   07/10/20 106 kg (234 lb)   02/25/20 110 kg (242 lb) "     Body mass index is 38.76 kg/m².        HISTORY OF PRESENT ILLNESS     Little bit of weight gain. Had 1st COVID-19 vaccine.   Not monitoring blood pressure closely. Compliant with medications.         REVIEW OF SYSTEMS       Constitutional neg except per HPI   Resp neg  CV neg     PHYSICAL EXAMINATION     Physical Exam  Obese  Cardiovascular: Normal rate, regular rhythm.   Pulm/Chest: Effort normal, breath sounds normal.       REVIEWED DATA     Labs:     Lab Results   Component Value Date     02/25/2020    K 4.1 02/25/2020    CALCIUM 9.1 02/25/2020    AST 18 02/25/2020    ALT 22 02/25/2020    BUN 13 02/25/2020    CREATININE 1.09 02/25/2020    CREATININE 1.06 01/04/2019       Lab Results   Component Value Date    HGBA1C 5.60 07/10/2020    HGBA1C 5.80 (H) 02/25/2020       Lab Results   Component Value Date    LDL 30 02/25/2020    LDL 56 03/06/2019    HDL 47 02/25/2020    TRIG 216 (H) 02/25/2020    CHOLHDLRATIO 2.55 02/25/2020       Lab Results   Component Value Date    TSH 2.010 05/23/2019    FREET4 1.02 05/23/2019            Imaging:           Medical Tests:           Summary of old records / correspondence / consultant report:           Request outside records:           MEDICATIONS AT THE TIME OF OFFICE VISIT     Current Outpatient Medications   Medication Sig Dispense Refill   • hydroCHLOROthiazide (HYDRODIURIL) 12.5 MG tablet Take 1 tablet by mouth Daily. 90 tablet 3   • loratadine (CLARITIN) 10 MG tablet Take 10 mg by mouth Daily.     • omeprazole (priLOSEC) 20 MG capsule TAKE 1 CAPSULE DAILY 90 capsule 3   • rosuvastatin (CRESTOR) 10 MG tablet TAKE 1 TABLET DAILY 90 tablet 3   • tamsulosin (FLOMAX) 0.4 MG capsule 24 hr capsule TAKE 1 CAPSULE EVERY NIGHT 90 capsule 3     No current facility-administered medications for this visit.            *Examiner was wearing [id number] mask, face shield and exam gloves during the entire duration of the visit. Patient was masked the entire time.   Minimum social distance  of 6 ft maintained entire visit except if physical contact was necessary as documented.     **Dragon Disclaimer:   Much of this encounter note is an electronic transcription/translation of spoken language to printed text. The electronic translation of spoken language may permit erroneous, or at times, nonsensical words or phrases to be inadvertently transcribed. Although I have reviewed the note for such errors, some may still exist.     Template created by Trey Torres MD

## 2021-01-13 NOTE — PATIENT INSTRUCTIONS
"** IMPORTANT MESSAGE FROM DR. ROLDAN **    In our office, your satisfaction is VERY important to us.     You may receive a survey from [employer] by mail or E-mail for you to provide feedback about your visit. This information is invaluable for me to know what we can do to improve our services.     I ask that you please take a few minutes to complete the survey and let us know how we are doing in serving your needs. (You may receive the survey more than once for multiple visits)    Thank You !    Dr. Roldan & Staff    _________________________________________________________________________________________________________________________      ** ADDITIONAL INSTRUCTION / REMINDERS FROM DR. ROLDAN **      Managing Your Hypertension  Hypertension is commonly called high blood pressure. This is when the force of your blood pressing against the walls of your arteries is too strong. Arteries are blood vessels that carry blood from your heart throughout your body. Hypertension forces the heart to work harder to pump blood, and may cause the arteries to become narrow or stiff. Having untreated or uncontrolled hypertension can cause heart attack, stroke, kidney disease, and other problems.  What are blood pressure readings?  A blood pressure reading consists of a higher number over a lower number. Ideally, your blood pressure should be below 120/80. The first (\"top\") number is called the systolic pressure. It is a measure of the pressure in your arteries as your heart beats. The second (\"bottom\") number is called the diastolic pressure. It is a measure of the pressure in your arteries as the heart relaxes.  What does my blood pressure reading mean?  Blood pressure is classified into four stages. Based on your blood pressure reading, your health care provider may use the following stages to determine what type of treatment you need, if any. Systolic pressure and diastolic pressure are measured in a unit called mm " Hg.  Normal  · Systolic pressure: below 120.  · Diastolic pressure: below 80.  Elevated  · Systolic pressure: 120-129.  · Diastolic pressure: below 80.  Hypertension stage 1  · Systolic pressure: 130-139.  · Diastolic pressure: 80-89.  Hypertension stage 2  · Systolic pressure: 140 or above.  · Diastolic pressure: 90 or above.  What health risks are associated with hypertension?  Managing your hypertension is an important responsibility. Uncontrolled hypertension can lead to:  · A heart attack.  · A stroke.  · A weakened blood vessel (aneurysm).  · Heart failure.  · Kidney damage.  · Eye damage.  · Metabolic syndrome.  · Memory and concentration problems.  What changes can I make to manage my hypertension?  Hypertension can be managed by making lifestyle changes and possibly by taking medicines. Your health care provider will help you make a plan to bring your blood pressure within a normal range.  Eating and drinking    · Eat a diet that is high in fiber and potassium, and low in salt (sodium), added sugar, and fat. An example eating plan is called the DASH (Dietary Approaches to Stop Hypertension) diet. To eat this way:  ? Eat plenty of fresh fruits and vegetables. Try to fill half of your plate at each meal with fruits and vegetables.  ? Eat whole grains, such as whole wheat pasta, brown rice, or whole grain bread. Fill about one quarter of your plate with whole grains.  ? Eat low-fat diary products.  ? Avoid fatty cuts of meat, processed or cured meats, and poultry with skin. Fill about one quarter of your plate with lean proteins such as fish, chicken without skin, beans, eggs, and tofu.  ? Avoid premade and processed foods. These tend to be higher in sodium, added sugar, and fat.  · Reduce your daily sodium intake. Most people with hypertension should eat less than 1,500 mg of sodium a day.  · Limit alcohol intake to no more than 1 drink a day for nonpregnant women and 2 drinks a day for men. One drink equals  12 oz of beer, 5 oz of wine, or 1½ oz of hard liquor.  Lifestyle  · Work with your health care provider to maintain a healthy body weight, or to lose weight. Ask what an ideal weight is for you.  · Get at least 30 minutes of exercise that causes your heart to beat faster (aerobic exercise) most days of the week. Activities may include walking, swimming, or biking.  · Include exercise to strengthen your muscles (resistance exercise), such as weight lifting, as part of your weekly exercise routine. Try to do these types of exercises for 30 minutes at least 3 days a week.  · Do not use any products that contain nicotine or tobacco, such as cigarettes and e-cigarettes. If you need help quitting, ask your health care provider.  · Control any long-term (chronic) conditions you have, such as high cholesterol or diabetes.  Monitoring  · Monitor your blood pressure at home as told by your health care provider. Your personal target blood pressure may vary depending on your medical conditions, your age, and other factors.  · Have your blood pressure checked regularly, as often as told by your health care provider.  Working with your health care provider  · Review all the medicines you take with your health care provider because there may be side effects or interactions.  · Talk with your health care provider about your diet, exercise habits, and other lifestyle factors that may be contributing to hypertension.  · Visit your health care provider regularly. Your health care provider can help you create and adjust your plan for managing hypertension.  Will I need medicine to control my blood pressure?  Your health care provider may prescribe medicine if lifestyle changes are not enough to get your blood pressure under control, and if:  · Your systolic blood pressure is 130 or higher.  · Your diastolic blood pressure is 80 or higher.  Take medicines only as told by your health care provider. Follow the directions carefully. Blood  pressure medicines must be taken as prescribed. The medicine does not work as well when you skip doses. Skipping doses also puts you at risk for problems.  Contact a health care provider if:  · You think you are having a reaction to medicines you have taken.  · You have repeated (recurrent) headaches.  · You feel dizzy.  · You have swelling in your ankles.  · You have trouble with your vision.  Get help right away if:  · You develop a severe headache or confusion.  · You have unusual weakness or numbness, or you feel faint.  · You have severe pain in your chest or abdomen.  · You vomit repeatedly.  · You have trouble breathing.  Summary  · Hypertension is when the force of blood pumping through your arteries is too strong. If this condition is not controlled, it may put you at risk for serious complications.  · Your personal target blood pressure may vary depending on your medical conditions, your age, and other factors. For most people, a normal blood pressure is less than 120/80.  · Hypertension is managed by lifestyle changes, medicines, or both. Lifestyle changes include weight loss, eating a healthy, low-sodium diet, exercising more, and limiting alcohol.  This information is not intended to replace advice given to you by your health care provider. Make sure you discuss any questions you have with your health care provider.  Document Revised: 04/10/2020 Document Reviewed: 11/15/2017  Elsevier Patient Education © 2020 [employer]

## 2021-01-14 ENCOUNTER — TELEPHONE (OUTPATIENT)
Dept: INTERNAL MEDICINE | Age: 61
End: 2021-01-14

## 2021-01-14 NOTE — PROGRESS NOTES
Call patient with results:    A1c level for blood sugar average and cholesterol are fine.    Kidney function has declined. Verify if he’s taking any over-the-counter nsaids. Increase water intake. Recheck BMP in 1 month (acute renal insufficiency)

## 2021-01-14 NOTE — TELEPHONE ENCOUNTER
----- Message from Bill Torres MD sent at 1/14/2021  7:02 AM EST -----  Call patient with results:    A1c level for blood sugar average and cholesterol are fine.    Kidney function has declined. Verify if he’s taking any over-the-counter nsaids. Increase water intake. Recheck BMP in 1 month (acute renal insufficiency)

## 2021-01-15 DIAGNOSIS — E78.5 HYPERLIPIDEMIA, UNSPECIFIED HYPERLIPIDEMIA TYPE: Primary | ICD-10-CM

## 2021-01-15 DIAGNOSIS — N28.9 ACUTE RENAL INSUFFICIENCY: ICD-10-CM

## 2021-01-21 DIAGNOSIS — I10 ESSENTIAL HYPERTENSION: ICD-10-CM

## 2021-01-21 RX ORDER — HYDROCHLOROTHIAZIDE 12.5 MG/1
TABLET ORAL
Qty: 90 TABLET | Refills: 3 | Status: SHIPPED | OUTPATIENT
Start: 2021-01-21 | End: 2022-01-17

## 2021-01-21 RX ORDER — TAMSULOSIN HYDROCHLORIDE 0.4 MG/1
CAPSULE ORAL
Qty: 90 CAPSULE | Refills: 3 | Status: SHIPPED | OUTPATIENT
Start: 2021-01-21 | End: 2022-01-05

## 2021-06-03 ENCOUNTER — OFFICE VISIT (OUTPATIENT)
Dept: INTERNAL MEDICINE | Age: 61
End: 2021-06-03

## 2021-06-03 VITALS
SYSTOLIC BLOOD PRESSURE: 128 MMHG | OXYGEN SATURATION: 99 % | BODY MASS INDEX: 38.73 KG/M2 | HEART RATE: 90 BPM | HEIGHT: 66 IN | DIASTOLIC BLOOD PRESSURE: 86 MMHG | WEIGHT: 241 LBS | TEMPERATURE: 97.1 F

## 2021-06-03 DIAGNOSIS — M72.2 PLANTAR FASCIITIS: ICD-10-CM

## 2021-06-03 DIAGNOSIS — E78.2 MIXED HYPERLIPIDEMIA: Chronic | ICD-10-CM

## 2021-06-03 DIAGNOSIS — I10 ESSENTIAL HYPERTENSION: Primary | Chronic | ICD-10-CM

## 2021-06-03 DIAGNOSIS — R73.03 PREDIABETES: Chronic | ICD-10-CM

## 2021-06-03 PROCEDURE — 99214 OFFICE O/P EST MOD 30 MIN: CPT | Performed by: INTERNAL MEDICINE

## 2021-06-03 NOTE — ASSESSMENT & PLAN NOTE
Recommended wearing splint in the evening (sleeps prone)  More regimented stretching exercise  Wt loss  Tylenol PRN

## 2021-06-03 NOTE — PROGRESS NOTES
"    I N T E R N A L  M E D I C I N E  J U N O H  K I M  M D      ENCOUNTER DATE:  06/03/2021    Junior Estrada . / 60 y.o. / male      CHIEF COMPLAINT / REASON FOR OFFICE VISIT     Hypertension, Prediabetes, and Hyperlipidemia      ASSESSMENT & PLAN     Problem List Items Addressed This Visit        High    Essential hypertension - Primary (Chronic)    Overview     Continue HCTZ 12.5 mg qd         Relevant Medications    hydroCHLOROthiazide (HYDRODIURIL) 12.5 MG tablet       Medium    Hyperlipidemia (Chronic)    Overview     Continue rosuvastatin 10 mg qd.          Relevant Medications    rosuvastatin (CRESTOR) 10 MG tablet    Prediabetes (Chronic)    Current Assessment & Plan     Mild wt gain again. Maintain a low sugar/starch/carbohydrate diet and exercise regularly. Wt loss advised.      Lab Results   Component Value Date    GLU 94 02/10/2021    GLU 97 01/13/2021     (H) 02/25/2020     Lab Results   Component Value Date    HGBA1C 5.60 01/13/2021    HGBA1C 5.60 07/10/2020    HGBA1C 5.80 (H) 02/25/2020                Low    Plantar fasciitis (Chronic)    Current Assessment & Plan     Recommended wearing splint in the evening (sleeps prone)  More regimented stretching exercise  Wt loss  Tylenol PRN              No orders of the defined types were placed in this encounter.    No orders of the defined types were placed in this encounter.      SUMMARY/DISCUSSION  •       Next Appointment with me: Visit date not found    Return in about 6 months (around 12/3/2021) for COMBINED annual physical & chronic medical.      VITAL SIGNS     Visit Vitals  /86 (BP Location: Left arm)   Pulse 90   Temp 97.1 °F (36.2 °C)   Ht 167.6 cm (65.98\")   Wt 109 kg (241 lb)   SpO2 99%   BMI 38.92 kg/m²       BP Readings from Last 3 Encounters:   06/03/21 128/86   01/13/21 132/86   07/10/20 126/86     Wt Readings from Last 3 Encounters:   06/03/21 109 kg (241 lb)   01/13/21 109 kg (240 lb)   07/10/20 106 kg (234 lb)     Body " mass index is 38.92 kg/m².      MEDICATIONS AT THE TIME OF OFFICE VISIT     Current Outpatient Medications on File Prior to Visit   Medication Sig   • hydroCHLOROthiazide (HYDRODIURIL) 12.5 MG tablet TAKE 1 TABLET DAILY   • loratadine (CLARITIN) 10 MG tablet Take 10 mg by mouth Daily.   • omeprazole (priLOSEC) 20 MG capsule TAKE 1 CAPSULE DAILY   • rosuvastatin (CRESTOR) 10 MG tablet TAKE 1 TABLET DAILY   • tamsulosin (FLOMAX) 0.4 MG capsule 24 hr capsule TAKE 1 CAPSULE EVERY NIGHT     No current facility-administered medications on file prior to visit.          HISTORY OF PRESENT ILLNESS     Blood pressure at home is doing fine.   Wt gain mild  Complains of chronic plantar fasciitis, has had injections in the past.   Creatinine improved after stopping NSAID   No problems with rosuvastatin for hyperlipidemia       Patient Care Team:  Bill Torres MD as PCP - General (Internal Medicine)    REVIEW OF SYSTEMS     Mild wt gain  No chest pain or [place]  GI neg       PHYSICAL EXAMINATION     Physical Exam  Obese, and  Cardiovascular: Normal rate, regular rhythm.   Trace bilateral lower extremities edema       REVIEWED DATA     Labs:     Lab Results   Component Value Date     02/10/2021    K 4.5 02/10/2021    CALCIUM 9.2 02/10/2021    AST 20 01/13/2021    ALT 15 01/13/2021    BUN 20 02/10/2021    CREATININE 1.17 02/10/2021    CREATININE 1.35 (H) 01/13/2021    CREATININE 1.09 02/25/2020    EGFRIFNONA 64 02/10/2021    EGFRIFAFRI 77 02/10/2021       Lab Results   Component Value Date    HGBA1C 5.60 01/13/2021    HGBA1C 5.60 07/10/2020    HGBA1C 5.80 (H) 02/25/2020       Lab Results   Component Value Date    LDL 47 01/13/2021    LDL 30 02/25/2020    HDL 64 (H) 01/13/2021    TRIG 98 01/13/2021       Lab Results   Component Value Date    TSH 2.010 05/23/2019    FREET4 1.02 05/23/2019       Lab Results   Component Value Date    WBC 5.21 01/04/2019    HGB 14.8 01/04/2019     01/04/2019         Imaging:           Medical  Tests:           Summary of old records / correspondence / consultant report:           Request outside records:             *Examiner was wearing KN95 mask during the entire duration of the visit. Patient was masked the entire time.   Minimum social distance of 6 ft maintained entire visit except if physical contact was necessary as documented.     **Dragon Disclaimer:   Much of this encounter note is an electronic transcription/translation of spoken language to printed text. The electronic translation of spoken language may permit erroneous, or at times, nonsensical words or phrases to be inadvertently transcribed. Although I have reviewed the note for such errors, some may still exist.     Template created by Trey Torres MD

## 2021-06-03 NOTE — ASSESSMENT & PLAN NOTE
Mild wt gain again. Maintain a low sugar/starch/carbohydrate diet and exercise regularly. Wt loss advised.      Lab Results   Component Value Date    GLU 94 02/10/2021    GLU 97 01/13/2021     (H) 02/25/2020     Lab Results   Component Value Date    HGBA1C 5.60 01/13/2021    HGBA1C 5.60 07/10/2020    HGBA1C 5.80 (H) 02/25/2020

## 2021-06-25 ENCOUNTER — TELEPHONE (OUTPATIENT)
Dept: INTERNAL MEDICINE | Age: 61
End: 2021-06-25

## 2021-10-07 DIAGNOSIS — E78.5 HYPERLIPIDEMIA, UNSPECIFIED HYPERLIPIDEMIA TYPE: Chronic | ICD-10-CM

## 2021-10-07 RX ORDER — ROSUVASTATIN CALCIUM 10 MG/1
TABLET, COATED ORAL
Qty: 90 TABLET | Refills: 3 | Status: SHIPPED | OUTPATIENT
Start: 2021-10-07 | End: 2022-10-03

## 2021-11-09 DIAGNOSIS — K21.9 GASTROESOPHAGEAL REFLUX DISEASE WITHOUT ESOPHAGITIS: ICD-10-CM

## 2021-11-09 RX ORDER — OMEPRAZOLE 20 MG/1
CAPSULE, DELAYED RELEASE ORAL
Qty: 90 CAPSULE | Refills: 3 | Status: SHIPPED | OUTPATIENT
Start: 2021-11-09 | End: 2022-11-04

## 2021-11-17 DIAGNOSIS — Z12.5 ENCOUNTER FOR SCREENING FOR MALIGNANT NEOPLASM OF PROSTATE: ICD-10-CM

## 2021-11-17 DIAGNOSIS — Z00.00 PREVENTATIVE HEALTH CARE: Primary | ICD-10-CM

## 2021-12-01 DIAGNOSIS — Z00.00 PREVENTATIVE HEALTH CARE: ICD-10-CM

## 2021-12-01 DIAGNOSIS — Z12.5 ENCOUNTER FOR SCREENING FOR MALIGNANT NEOPLASM OF PROSTATE: ICD-10-CM

## 2021-12-09 LAB
ALBUMIN SERPL-MCNC: 4.1 G/DL (ref 3.8–4.8)
ALBUMIN/GLOB SERPL: 1.5 {RATIO} (ref 1.2–2.2)
ALP SERPL-CCNC: 59 IU/L (ref 44–121)
ALT SERPL-CCNC: 16 IU/L (ref 0–44)
AST SERPL-CCNC: 17 IU/L (ref 0–40)
BASOPHILS # BLD AUTO: 0.1 X10E3/UL (ref 0–0.2)
BASOPHILS NFR BLD AUTO: 1 %
BILIRUB SERPL-MCNC: 0.5 MG/DL (ref 0–1.2)
BUN SERPL-MCNC: 14 MG/DL (ref 8–27)
BUN/CREAT SERPL: 11 (ref 10–24)
CALCIUM SERPL-MCNC: 9.2 MG/DL (ref 8.6–10.2)
CHLORIDE SERPL-SCNC: 103 MMOL/L (ref 96–106)
CHOLEST SERPL-MCNC: 156 MG/DL (ref 100–199)
CHOLEST/HDLC SERPL: 2.3 RATIO (ref 0–5)
CO2 SERPL-SCNC: 25 MMOL/L (ref 20–29)
CREAT SERPL-MCNC: 1.29 MG/DL (ref 0.76–1.27)
EOSINOPHIL # BLD AUTO: 0.2 X10E3/UL (ref 0–0.4)
EOSINOPHIL NFR BLD AUTO: 3 %
ERYTHROCYTE [DISTWIDTH] IN BLOOD BY AUTOMATED COUNT: 14.3 % (ref 11.6–15.4)
GLOBULIN SER CALC-MCNC: 2.8 G/DL (ref 1.5–4.5)
GLUCOSE SERPL-MCNC: 107 MG/DL (ref 65–99)
HBA1C MFR BLD: 6.1 % (ref 4.8–5.6)
HCT VFR BLD AUTO: 37.5 % (ref 37.5–51)
HDLC SERPL-MCNC: 69 MG/DL
HGB BLD-MCNC: 11.7 G/DL (ref 13–17.7)
IMM GRANULOCYTES # BLD AUTO: 0 X10E3/UL (ref 0–0.1)
IMM GRANULOCYTES NFR BLD AUTO: 0 %
LDLC SERPL CALC-MCNC: 70 MG/DL (ref 0–99)
LYMPHOCYTES # BLD AUTO: 1.8 X10E3/UL (ref 0.7–3.1)
LYMPHOCYTES NFR BLD AUTO: 30 %
MCH RBC QN AUTO: 25 PG (ref 26.6–33)
MCHC RBC AUTO-ENTMCNC: 31.2 G/DL (ref 31.5–35.7)
MCV RBC AUTO: 80 FL (ref 79–97)
MONOCYTES # BLD AUTO: 0.7 X10E3/UL (ref 0.1–0.9)
MONOCYTES NFR BLD AUTO: 12 %
NEUTROPHILS # BLD AUTO: 3.4 X10E3/UL (ref 1.4–7)
NEUTROPHILS NFR BLD AUTO: 54 %
PLATELET # BLD AUTO: 262 X10E3/UL (ref 150–450)
POTASSIUM SERPL-SCNC: 4.2 MMOL/L (ref 3.5–5.2)
PROT SERPL-MCNC: 6.9 G/DL (ref 6–8.5)
PSA SERPL-MCNC: 0.4 NG/ML (ref 0–4)
RBC # BLD AUTO: 4.68 X10E6/UL (ref 4.14–5.8)
SODIUM SERPL-SCNC: 139 MMOL/L (ref 134–144)
T4 FREE SERPL-MCNC: 0.94 NG/DL (ref 0.82–1.77)
TRIGL SERPL-MCNC: 95 MG/DL (ref 0–149)
TSH SERPL DL<=0.005 MIU/L-ACNC: 3.3 UIU/ML (ref 0.45–4.5)
VLDLC SERPL CALC-MCNC: 17 MG/DL (ref 5–40)
WBC # BLD AUTO: 6.1 X10E3/UL (ref 3.4–10.8)

## 2022-01-05 DIAGNOSIS — N40.1 BENIGN PROSTATIC HYPERPLASIA WITH LOWER URINARY TRACT SYMPTOMS, SYMPTOM DETAILS UNSPECIFIED: Primary | ICD-10-CM

## 2022-01-05 RX ORDER — TAMSULOSIN HYDROCHLORIDE 0.4 MG/1
CAPSULE ORAL
Qty: 90 CAPSULE | Refills: 3 | Status: SHIPPED | OUTPATIENT
Start: 2022-01-05 | End: 2023-01-23

## 2022-01-12 ENCOUNTER — OFFICE VISIT (OUTPATIENT)
Dept: INTERNAL MEDICINE | Age: 62
End: 2022-01-12

## 2022-01-12 VITALS
DIASTOLIC BLOOD PRESSURE: 94 MMHG | TEMPERATURE: 97.1 F | HEART RATE: 87 BPM | BODY MASS INDEX: 40.66 KG/M2 | OXYGEN SATURATION: 99 % | SYSTOLIC BLOOD PRESSURE: 140 MMHG | WEIGHT: 253 LBS | HEIGHT: 66 IN

## 2022-01-12 DIAGNOSIS — R73.03 PREDIABETES: Chronic | ICD-10-CM

## 2022-01-12 DIAGNOSIS — I10 ESSENTIAL HYPERTENSION: Chronic | ICD-10-CM

## 2022-01-12 DIAGNOSIS — E66.01 MORBID OBESITY: Chronic | ICD-10-CM

## 2022-01-12 DIAGNOSIS — E78.2 MIXED HYPERLIPIDEMIA: Chronic | ICD-10-CM

## 2022-01-12 DIAGNOSIS — Z00.00 ENCOUNTER FOR ANNUAL HEALTH EXAMINATION: Primary | ICD-10-CM

## 2022-01-12 DIAGNOSIS — Z12.11 SCREENING FOR COLON CANCER: ICD-10-CM

## 2022-01-12 DIAGNOSIS — D64.89 ANEMIA DUE TO OTHER CAUSE, NOT CLASSIFIED: ICD-10-CM

## 2022-01-12 PROBLEM — M72.2 PLANTAR FASCIITIS: Chronic | Status: RESOLVED | Noted: 2021-06-03 | Resolved: 2022-01-12

## 2022-01-12 PROCEDURE — 90686 IIV4 VACC NO PRSV 0.5 ML IM: CPT | Performed by: INTERNAL MEDICINE

## 2022-01-12 PROCEDURE — 99214 OFFICE O/P EST MOD 30 MIN: CPT | Performed by: INTERNAL MEDICINE

## 2022-01-12 PROCEDURE — 99396 PREV VISIT EST AGE 40-64: CPT | Performed by: INTERNAL MEDICINE

## 2022-01-12 PROCEDURE — 90471 IMMUNIZATION ADMIN: CPT | Performed by: INTERNAL MEDICINE

## 2022-01-12 RX ORDER — METFORMIN HYDROCHLORIDE 500 MG/1
TABLET, EXTENDED RELEASE ORAL
Qty: 30 TABLET | Refills: 4 | Status: SHIPPED | OUTPATIENT
Start: 2022-01-12 | End: 2022-03-28 | Stop reason: SDUPTHER

## 2022-01-12 NOTE — ASSESSMENT & PLAN NOTE
Lab Results   Component Value Date    HGB 11.7 (L) 12/08/2021    HGB 14.8 01/04/2019    HGB 14.5 02/24/2017      Lab Results   Component Value Date    MCV 80 12/08/2021       Probably from regular blood donation and iron deficiency.   Hold off on further blood donation. Increase iron in diet. Will recheck labs on follow-up.

## 2022-01-12 NOTE — ASSESSMENT & PLAN NOTE
BP Readings from Last 3 Encounters:   01/12/22 140/94   06/03/21 128/86   01/13/21 132/86      [facility] BP link for 4 weeks.    Continue HCTZ 12.5 mg qd for now.   Weight loss advised, low sodium diet, exercise.

## 2022-01-12 NOTE — PROGRESS NOTES
"    I N T E R N A L  M E D I C I N E  J U N O H  K I M,  M D      ENCOUNTER DATE:  01/12/2022    Junior Estrada Jr. / 61 y.o. / male    CHIEF COMPLAINT     Annual Exam (1/4/19) and CHRONIC MEDICAL PROBLEMS      VITALS     Visit Vitals  /94 (BP Location: Left arm)   Pulse 87   Temp 97.1 °F (36.2 °C)   Ht 167.6 cm (65.98\")   Wt 115 kg (253 lb)   SpO2 99%   BMI 40.86 kg/m²       BP Readings from Last 3 Encounters:   01/12/22 140/94   06/03/21 128/86   01/13/21 132/86     Wt Readings from Last 3 Encounters:   01/12/22 115 kg (253 lb)   06/03/21 109 kg (241 lb)   01/13/21 109 kg (240 lb)      Body mass index is 40.86 kg/m².    MEDICATIONS     Current Outpatient Medications on File Prior to Visit   Medication Sig Dispense Refill   • hydroCHLOROthiazide (HYDRODIURIL) 12.5 MG tablet TAKE 1 TABLET DAILY 90 tablet 3   • loratadine (CLARITIN) 10 MG tablet Take 10 mg by mouth Daily.     • omeprazole (priLOSEC) 20 MG capsule TAKE 1 CAPSULE DAILY 90 capsule 3   • rosuvastatin (CRESTOR) 10 MG tablet TAKE 1 TABLET DAILY 90 tablet 3   • tamsulosin (FLOMAX) 0.4 MG capsule 24 hr capsule TAKE 1 CAPSULE EVERY NIGHT 90 capsule 3     No current facility-administered medications on file prior to visit.         HISTORY OF PRESENT ILLNESS      Junior presents for annual health maintenance visit.  Patient has been donating blood on a regular basis for the last 2 years. Most recently he was turned away due to presence of anemia. Denies melena or blood in the stool. A1c level is 6.1 this time. He has gained significant weight since the summer. He has been eating fast food on a regular basis and walks from his basement for 15 minutes daily for exercise. Blood pressure at home is reported to be less than 140/90 on a consistent basis taking hydrochlorothiazide 12.5 mg. LDL cholesterol is notably higher 10 mg.    · General health: multiple medical problems  · Lifestyle:  · Attempting to lose weight?: No   · Diet: has poor dietary habits " and caloric intake excessive  · Exercise: walks 15 minutes in his basement   · Tobacco: Former smoker   · Alcohol: occasional/infrequent  · Work: Full-time  · Reproductive health:  · Sexually active?: No   · Concern for STD?: No   · Sexual problems?: No problems   · Sees Urologist?: No   · Depression Screening:      PHQ-2/PHQ-9 Depression Screening 1/12/2022   Little interest or pleasure in doing things 0   Feeling down, depressed, or hopeless 0   Total Score 0         PHQ-2: 0 (Not depressed)     PHQ-9: 0 (Negative screening for depression)    Patient Care Team:  Bill Torres MD as PCP - General (Internal Medicine)  ______________________________________________________________________    ALLERGIES  Allergies   Allergen Reactions   • No Known Drug Allergy Other (See Comments)     none        PFSH:     The following portions of the patient's history were reviewed and updated as appropriate: Allergies / Current Medications / Past Medical History / Surgical History / Social History / Family History    PROBLEM LIST   Patient Active Problem List   Diagnosis   • Tubular adenoma of colon   • Gastroesophageal reflux disease without esophagitis   • Hyperlipidemia   • Benign prostatic hyperplasia with lower urinary tract symptoms   • Morbid obesity (HCC)   • JULIO CESAR (obstructive sleep apnea)   • Allergic rhinitis due to pollen   • Essential hypertension   • Prediabetes   • Other specified anemias       PAST MEDICAL HISTORY  Past Medical History:   Diagnosis Date   • Cataract     A small cataract has been noted on my left eye since 1987   • Diabetes mellitus (HCC) 2008    Elevated blood sugar noted on blood test.  Monitored and rem   • GERD (gastroesophageal reflux disease) 2018   • History of skin cancer    • Plantar fasciitis 6/3/2021   • Seasonal allergies        SURGICAL HISTORY  Past Surgical History:   Procedure Laterality Date   • CARPAL TUNNEL RELEASE     • COLONOSCOPY N/A 5/8/2017    Procedure: COLONOSCOPY into cecum  with cold snare polypectomy;  Surgeon: Emmanuel Ayala MD;  Location: [facility] ENDOSCOPY;  Service:    • NASAL POLYP EXCISION     • SKIN CANCER EXCISION         SOCIAL HISTORY  Social History     Socioeconomic History   • Marital status: Single   • Number of children: 0   Tobacco Use   • Smoking status: Former Smoker     Packs/day: 0.50     Years: 20.00     Pack years: 10.00     Quit date: 2000     Years since quittin.1   • Smokeless tobacco: Former User     Types: Snuff, Chew   Substance and Sexual Activity   • Alcohol use: Yes     Comment: occasional    • Drug use: No   • Sexual activity: Not Currently     Partners: Female     Comment: Abstinent for 20+ years       FAMILY HISTORY  Family History   Problem Relation Age of Onset   • Dementia Mother 70   • Hypertension Mother    • Ulcers Father    • Dementia Father 80        nursing home   • Liver cancer Paternal Grandmother    • No Known Problems Sister    • No Known Problems Brother    • Dementia Maternal Grandmother    • Prostate cancer Maternal Grandfather    • Obesity Sister    • No Known Problems Sister    • Prostate cancer Maternal Uncle    • Dementia Maternal Uncle         had MVA with head injury   • Colon cancer Neg Hx    • Colon polyps Neg Hx    • Diabetes Neg Hx        IMMUNIZATION HISTORY  Immunization History   Administered Date(s) Administered   • COVID-19 (PFIZER) 2021, 2021, 10/24/2021   • FluLaval/Fluarix/Fluzone >6 2020   • Influenza, Unspecified 2019, 2020   • Tdap 2017   • flucelvax quad pfs =>4 YRS 2019         REVIEW OF SYSTEMS     Review of Systems   Constitutional: Positive for unexpected weight change.   HENT: Negative.    Eyes: Negative.    Respiratory: Negative.    Cardiovascular: Negative.  Negative for chest pain.   Gastrointestinal: Negative.  Negative for blood in stool.   Endocrine: Negative.         Prediabetes and wt gain   Genitourinary: Negative.    Musculoskeletal: Negative.     Skin: Negative.    Allergic/Immunologic: Negative.    Neurological: Negative.    Hematological: Negative.    Psychiatric/Behavioral: Negative.          PHYSICAL EXAMINATION     Physical Exam  Constitutional:       General: He is not in acute distress.     Appearance: He is well-developed. He is obese.   HENT:      Head: Normocephalic and atraumatic.      Right Ear: Tympanic membrane, ear canal and external ear normal.      Left Ear: Tympanic membrane, ear canal and external ear normal.   Eyes:      General: No scleral icterus.     Conjunctiva/sclera: Conjunctivae normal.      Pupils: Pupils are equal, round, and reactive to light.   Neck:      Thyroid: No thyroid mass or thyromegaly.      Vascular: No carotid bruit.      Trachea: No tracheal deviation.   Cardiovascular:      Rate and Rhythm: Normal rate and regular rhythm.      Pulses: Normal pulses.      Heart sounds: Normal heart sounds.      Comments: No carotid bruit  Pulmonary:      Effort: Pulmonary effort is normal.      Breath sounds: Normal breath sounds.   Chest:   Breasts:      Right: No supraclavicular adenopathy.      Left: No supraclavicular adenopathy.       Abdominal:      General: There is no distension.      Palpations: Abdomen is soft. There is no mass.      Tenderness: There is no abdominal tenderness.      Hernia: No hernia is present.      Comments: Protuberant    Musculoskeletal:      Cervical back: Neck supple.      Right lower leg: Edema (1+) present.      Left lower leg: Edema (1+) present.   Lymphadenopathy:      Cervical: No cervical adenopathy.      Upper Body:      Right upper body: No supraclavicular adenopathy.      Left upper body: No supraclavicular adenopathy.   Skin:     General: Skin is warm.      Coloration: Skin is not jaundiced or pale.      Findings: No lesion (Negative for suspicious skin lesions/growths) or rash.      Comments: No jaundice  No suspicious skin lesions.    Neurological:      Mental Status: He is alert and  oriented to person, place, and time.      Cranial Nerves: No cranial nerve deficit.      Motor: No abnormal muscle tone.   Psychiatric:         Mood and Affect: Mood normal.         Behavior: Behavior normal.         Thought Content: Thought content normal.         Judgment: Judgment normal.         REVIEWED DATA      Labs:    Lab Results   Component Value Date     12/08/2021    K 4.2 12/08/2021    CALCIUM 9.2 12/08/2021    AST 17 12/08/2021    ALT 16 12/08/2021    BUN 14 12/08/2021    CREATININE 1.29 (H) 12/08/2021    CREATININE 1.17 02/10/2021    CREATININE 1.35 (H) 01/13/2021    EGFRIFNONA 59 (L) 12/08/2021    EGFRIFAFRI 69 12/08/2021       Lab Results   Component Value Date    HGBA1C 6.1 (H) 12/08/2021    HGBA1C 5.60 01/13/2021    HGBA1C 5.60 07/10/2020    TSH 3.300 12/08/2021    FREET4 0.94 12/08/2021       Lab Results   Component Value Date    PSA 0.4 12/08/2021    PSA 0.548 01/04/2019    PSA 0.377 02/24/2017       Lab Results   Component Value Date    TESTOSTERONE 282.6 05/23/2019    TESTFRE 3.2 (L) 05/23/2019       Lab Results   Component Value Date    LDL 70 12/08/2021    HDL 69 12/08/2021    TRIG 95 12/08/2021    CHOLHDLRATIO 2.3 12/08/2021       No components found for: [id number]    Lab Results   Component Value Date    WBC 6.1 12/08/2021    HGB 11.7 (L) 12/08/2021    MCV 80 12/08/2021     12/08/2021       Lab Results   Component Value Date    PROTEIN Negative 01/04/2019    GLUCOSEU Negative 01/04/2019    BLOODU Negative 01/04/2019    NITRITEU Negative 01/04/2019    LEUKOCYTESUR Negative 01/04/2019          Lab Results   Component Value Date    HEPCVIRUSABY <0.1 01/04/2019       Imaging:           Medical Tests:           ASSESSMENT & PLAN     ANNUAL WELLNESS EXAM / PHYSICAL     Other medical problems addressed today:  Problem List Items Addressed This Visit        High    Essential hypertension (Chronic)    Overview     Continue HCTZ 12.5 mg qd         Current Assessment & Plan     BP  Readings from Last 3 Encounters:   01/12/22 140/94   06/03/21 128/86   01/13/21 132/86      [employer] BP link for 4 weeks.    Continue HCTZ 12.5 mg qd for now.   Weight loss advised, low sodium diet, exercise.          Relevant Medications    hydroCHLOROthiazide (HYDRODIURIL) 12.5 MG tablet    Other Relevant Orders    [employer] BP Flowsheet    Prediabetes (Chronic)    Current Assessment & Plan     Lab Results   Component Value Date    HGBA1C 6.1 (H) 12/08/2021    HGBA1C 5.60 01/13/2021    HGBA1C 5.60 07/10/2020        Start metformin  mg daily.          Relevant Medications    metFORMIN ER (GLUCOPHAGE-XR) 500 MG 24 hr tablet       Medium    Hyperlipidemia (Chronic)    Overview     Continue rosuvastatin 10 mg qd.          Current Assessment & Plan     Wt gain, LDL cholesterol higher. Weight loss advised. Maintain low saturated fat/cholesterol diet.  Continue rosuvastatin 10 mg qd.     Lab Results   Component Value Date    LDL 70 12/08/2021    LDL 47 01/13/2021    LDL 30 02/25/2020    HDL 69 12/08/2021    TRIG 95 12/08/2021    CHOLHDLRATIO 2.3 12/08/2021             Relevant Medications    rosuvastatin (CRESTOR) 10 MG tablet    Morbid obesity (HCC) (Chronic)    Current Assessment & Plan     Wt Readings from Last 3 Encounters:   01/12/22 115 kg (253 lb)   06/03/21 109 kg (241 lb)   01/13/21 109 kg (240 lb)      Body mass index is 40.86 kg/m².    Maintain a low sugar/starch/carbohydrate diet and exercise regularly. Wt loss advised.    Needs to stop eating unhealthy fast food. Increase physical activity.          Other specified anemias    Current Assessment & Plan     Lab Results   Component Value Date    HGB 11.7 (L) 12/08/2021    HGB 14.8 01/04/2019    HGB 14.5 02/24/2017      Lab Results   Component Value Date    MCV 80 12/08/2021       Probably from regular blood donation and iron deficiency.   Hold off on further blood donation. Increase iron in diet. Will recheck labs on follow-up.            Other Visit  Diagnoses     Encounter for annual health examination    -  Primary    Screening for colon cancer        Relevant Orders    Ambulatory Referral For Screening Colonoscopy          Summary/Discussion:     ·     Next Appointment with me: Visit date not found    Return in about 4 months (around 5/12/2022) for Prediabetes, Hypertension, anemia.      HEALTHCARE MAINTENANCE ISSUES       Cancer Screening:  · Colon: Initial/Next screening at age: CURRENT  · Repeat colon cancer screening: every 5 years (placed future referral)  · Prostate: Performed today and recommended annual screening  · Testicular: Recommended monthly self exam  · Skin: Monthly self skin examination, annual exam by health professional  · Lung: Does not meet criteria for lung cancer screening.   · Other:    Screening Labs & Tests:  · Lab results reviewed & discussed with with patient or orders placed today.  · EKG:  · CV Screening: Lipid panel  · DEXA (75+ or risk factors):   · HEP C (If born [id number] or risk factors): Previously had negative screen  · Other:     Immunization/Vaccinations (to be given today unless deferred by patient)  · Influenza: Give flu shot today  · Hepatitis A: Recommended here or at pharmacy  · Hepatitis B: Not needed at this time  · Tetanus/Pertussis: Up to date  · Pneumovax: Not needed at this time  · Shingles: Recommended Shingrix at pharmacy  · COVID: DAVID DEVI COVID: Completed primary vaccine series and booster  Lifestyle Counseling:  · Lifestyle Modifications: Attempt to lose weight, Improve dietary compliance, Increase intensity/regularity of aerobic exercise, Maintain a low sugar/carbohydrate diet, Follow a low fat, low cholesterol diet, Maintain low sodium diet (< 3 gm) for blood pressure and Discussed sexual issues, safe sex practices, contraception  · Safety Issues: Always wear seatbelt, Avoid texting while driving   · Use sunscreen, regular skin examination  · Recommended annual dental/vision  examination.  · Emotional/Stress/Sleep: Reviewed and  given when appropriate      Health Maintenance   Topic Date Due   • ZOSTER VACCINE (1 of 2) Never done   • INFLUENZA VACCINE  08/01/2021   • COLORECTAL CANCER SCREENING  05/08/2022   • PROSTATE CANCER SCREENING  12/08/2022   • LIPID PANEL  12/08/2022   • ANNUAL PHYSICAL  01/13/2023   • TDAP/TD VACCINES (2 - Td or Tdap) 02/21/2027   • HEPATITIS C SCREENING  Completed   • COVID-19 Vaccine  Completed   • Pneumococcal Vaccine 0-64  Aged Out           *Examiner was wearing KN95 mask and eye protection during the entire duration of the visit. Patient was masked the entire time. Minimum social distance of 6 ft maintained entire visit except if physical contact was necessary as documented.     **Dragon Disclaimer: Much of this encounter note is an electronic transcription/translation of spoken language to printed text. The electronic translation of spoken language may permit erroneous, or at times, nonsensical words or phrases to be inadvertently transcribed. Although I have reviewed the note for such errors, some may still exist.       Template created by Trey Torres MD

## 2022-01-12 NOTE — PATIENT INSTRUCTIONS
** IMPORTANT MESSAGE FROM DR. ROLDAN **    In our office, your satisfaction is VERY important to us.     You may receive a survey from [facility] by mail or E-mail for you to provide feedback about your visit. This information is invaluable for me to know what we can do to improve our services.     I ask that you please take a few minutes to complete the survey and let us know how we are doing in serving your needs. (You may receive the survey more than once for multiple visits)    Thank You !    Dr. Roldan & Staff    _________________________________________________________________________________________________________________________      ** ADDITIONAL INSTRUCTION / REMINDERS FROM DR. ROLDAN **

## 2022-01-12 NOTE — ASSESSMENT & PLAN NOTE
----- Message from Aaron Chowdhury sent at 8/23/2019 11:58 AM CDT -----  Contact: Patient  Type: Needs Medical Advice    Who Called:  Patient  Best Call Back Number: [phone]  Additional Information: Patient would like to discuss test results. Please call to advise. Thanks!   Wt gain, LDL cholesterol higher. Weight loss advised. Maintain low saturated fat/cholesterol diet.  Continue rosuvastatin 10 mg qd.     Lab Results   Component Value Date    LDL 70 12/08/2021    LDL 47 01/13/2021    LDL 30 02/25/2020    HDL 69 12/08/2021    TRIG 95 12/08/2021    CHOLHDLRATIO 2.3 12/08/2021

## 2022-01-12 NOTE — ASSESSMENT & PLAN NOTE
Lab Results   Component Value Date    HGBA1C 6.1 (H) 12/08/2021    HGBA1C 5.60 01/13/2021    HGBA1C 5.60 07/10/2020        Start metformin  mg daily.

## 2022-01-12 NOTE — ASSESSMENT & PLAN NOTE
Wt Readings from Last 3 Encounters:   01/12/22 115 kg (253 lb)   06/03/21 109 kg (241 lb)   01/13/21 109 kg (240 lb)      Body mass index is 40.86 kg/m².    Maintain a low sugar/starch/carbohydrate diet and exercise regularly. Wt loss advised.    Needs to stop eating unhealthy fast food. Increase physical activity.

## 2022-01-17 DIAGNOSIS — I10 ESSENTIAL HYPERTENSION: ICD-10-CM

## 2022-01-17 RX ORDER — HYDROCHLOROTHIAZIDE 12.5 MG/1
TABLET ORAL
Qty: 90 TABLET | Refills: 3 | Status: SHIPPED | OUTPATIENT
Start: 2022-01-17 | End: 2023-01-11

## 2022-01-18 ENCOUNTER — PRE-PROCEDURE SCREENING (OUTPATIENT)
Dept: GASTROENTEROLOGY | Facility: CLINIC | Age: 62
End: 2022-01-18

## 2022-03-28 DIAGNOSIS — R73.03 PREDIABETES: Chronic | ICD-10-CM

## 2022-03-28 RX ORDER — METFORMIN HYDROCHLORIDE 500 MG/1
TABLET, EXTENDED RELEASE ORAL
Qty: 90 TABLET | Refills: 3 | Status: SHIPPED | OUTPATIENT
Start: 2022-03-28

## 2022-05-18 ENCOUNTER — OFFICE VISIT (OUTPATIENT)
Dept: INTERNAL MEDICINE | Age: 62
End: 2022-05-18

## 2022-05-18 VITALS
HEART RATE: 82 BPM | OXYGEN SATURATION: 99 % | DIASTOLIC BLOOD PRESSURE: 88 MMHG | TEMPERATURE: 97.1 F | SYSTOLIC BLOOD PRESSURE: 136 MMHG | BODY MASS INDEX: 39.53 KG/M2 | WEIGHT: 246 LBS | HEIGHT: 66 IN

## 2022-05-18 DIAGNOSIS — R73.03 PREDIABETES: Chronic | ICD-10-CM

## 2022-05-18 DIAGNOSIS — E78.2 MIXED HYPERLIPIDEMIA: Chronic | ICD-10-CM

## 2022-05-18 DIAGNOSIS — E66.01 CLASS 2 SEVERE OBESITY DUE TO EXCESS CALORIES WITH SERIOUS COMORBIDITY AND BODY MASS INDEX (BMI) OF 39.0 TO 39.9 IN ADULT: Chronic | ICD-10-CM

## 2022-05-18 DIAGNOSIS — I10 ESSENTIAL HYPERTENSION: Primary | Chronic | ICD-10-CM

## 2022-05-18 PROBLEM — E66.812 CLASS 2 SEVERE OBESITY DUE TO EXCESS CALORIES WITH SERIOUS COMORBIDITY AND BODY MASS INDEX (BMI) OF 39.0 TO 39.9 IN ADULT: Chronic | Status: ACTIVE | Noted: 2019-05-23

## 2022-05-18 PROCEDURE — 99214 OFFICE O/P EST MOD 30 MIN: CPT | Performed by: INTERNAL MEDICINE

## 2022-05-18 NOTE — ASSESSMENT & PLAN NOTE
Mild weight loss with metformin  mg.   Maintain a low sugar/starch/carbohydrate diet.   Discussed GLP-1 agonist and bariatrics.   Info provided on GLP-1 agonist.

## 2022-05-18 NOTE — PATIENT INSTRUCTIONS
** IMPORTANT MESSAGE FROM DR. ROLDAN **    In our office, your satisfaction is VERY important to us.     You may receive a survey from [employer] by mail or E-mail for you to provide feedback about your visit. This information is invaluable for me to know what we can do to improve our services.     I ask that you please take a few minutes to complete the survey and let us know how we are doing in serving your needs. (You may receive the survey more than once for multiple visits)    Thank You !    Dr. Roldan    _________________________________________________________________________________________________________________________      ** ADDITIONAL INSTRUCTION / REMINDERS FROM DR. ROLDAN **    Look into coverage for Wegovy / Saxenda for weight loss.

## 2022-05-18 NOTE — ASSESSMENT & PLAN NOTE
Previously started metformin  mg. Check A1c.   Maintain a low sugar/starch/carbohydrate diet.   Discussed GLP-1 agonist for obesity.     Lab Results   Component Value Date    HGBA1C 6.1 (H) 12/08/2021    HGBA1C 5.60 01/13/2021    HGBA1C 5.60 07/10/2020

## 2022-05-18 NOTE — PROGRESS NOTES
I N T E R N A L  M E D I C I N E  J U N O H  K I M,  M D      ENCOUNTER DATE:  05/18/2022    Junior Estrada . / 61 y.o. / male      CHIEF COMPLAINT / REASON FOR OFFICE VISIT     Prediabetes, Hypertension, and Anemia      ASSESSMENT & PLAN     Problem List Items Addressed This Visit        High    Essential hypertension - Primary (Chronic)    Overview     Continue HCTZ 12.5 mg qd           Relevant Medications    hydroCHLOROthiazide (HYDRODIURIL) 12.5 MG tablet    Other Relevant Orders    Comprehensive Metabolic Panel    Prediabetes (Chronic)    Current Assessment & Plan     Previously started metformin  mg. Check A1c.   Maintain a low sugar/starch/carbohydrate diet.   Discussed GLP-1 agonist for obesity.     Lab Results   Component Value Date    HGBA1C 6.1 (H) 12/08/2021    HGBA1C 5.60 01/13/2021    HGBA1C 5.60 07/10/2020               Relevant Medications    metFORMIN ER (GLUCOPHAGE-XR) 500 MG 24 hr tablet    Other Relevant Orders    Hemoglobin A1c       Medium    Hyperlipidemia (Chronic)    Overview     Continue rosuvastatin 10 mg qd.            Relevant Medications    rosuvastatin (CRESTOR) 10 MG tablet    Other Relevant Orders    Comprehensive Metabolic Panel    Class 2 severe obesity due to excess calories with serious comorbidity and body mass index (BMI) of 39.0 to 39.9 in adult (HCC) (Chronic)    Current Assessment & Plan     Mild weight loss with metformin  mg.   Maintain a low sugar/starch/carbohydrate diet.   Discussed GLP-1 agonist and bariatrics.   Info provided on GLP-1 agonist.                Orders Placed This Encounter   Procedures   • Comprehensive Metabolic Panel   • Hemoglobin A1c     No orders of the defined types were placed in this encounter.      SUMMARY/DISCUSSION  •       Next Appointment with me: Visit date not found    Return in about 6 months (around 11/18/2022) for Prediabetes, Hypertension, Obesity.      VITAL SIGNS     Visit Vitals  /88 (BP Location: Left  "arm)   Pulse 82   Temp 97.1 °F (36.2 °C)   Ht 167.6 cm (65.98\")   Wt 112 kg (246 lb)   SpO2 99%   BMI 39.73 kg/m²       BP Readings from Last 3 Encounters:   05/18/22 136/88   01/12/22 140/94   06/03/21 128/86     Wt Readings from Last 3 Encounters:   05/18/22 112 kg (246 lb)   01/12/22 115 kg (253 lb)   06/03/21 109 kg (241 lb)     Body mass index is 39.73 kg/m².    Blood pressure readings recorded on patient flowsheet:    Time Systolic Diastolic Pulse   5/18/2022  7:48  84 74   5/17/2022  9:33  78 84   5/17/2022  7:54  88 70   5/16/2022  9:33  82 81   5/16/2022  8:22  90 71   5/15/2022  9:49  79 74   5/15/2022  8:28  81 72   5/13/2022  9:50  87 74   5/13/2022  7:49  92 74   5/12/2022  7:34  93 71          MEDICATIONS AT THE TIME OF OFFICE VISIT     Current Outpatient Medications on File Prior to Visit   Medication Sig   • hydroCHLOROthiazide (HYDRODIURIL) 12.5 MG tablet TAKE 1 TABLET DAILY   • loratadine (CLARITIN) 10 MG tablet Take 10 mg by mouth Daily.   • metFORMIN ER (GLUCOPHAGE-XR) 500 MG 24 hr tablet Take 1 tablet with dinner.   • omeprazole (priLOSEC) 20 MG capsule TAKE 1 CAPSULE DAILY   • rosuvastatin (CRESTOR) 10 MG tablet TAKE 1 TABLET DAILY   • tamsulosin (FLOMAX) 0.4 MG capsule 24 hr capsule TAKE 1 CAPSULE EVERY NIGHT     No current facility-administered medications on file prior to visit.          HISTORY OF PRESENT ILLNESS     Previously started on metformin  mg for prediabetes with A1c of 6.1.  Mild weight loss is noted he has tried to make some dietary modifications.  BMI is slightly lower at 39.73.  Hypertension remains well controlled at home on hydrochlorothiazide 12.5 mg.  Takes rosuvastatin 10 mg without significant problems for hyperlipidemia.  On tamsulosin 0.4 mg daily for BPH.      Patient Care Team:  Bill Torres MD as PCP - General (Internal Medicine)    REVIEW OF SYSTEMS     Review of Systems   Constitutional neg except " per HPI   Resp neg  CV neg   GI negative     PHYSICAL EXAMINATION     Physical Exam  General: No acute distress. Obese.   Psych: Normal thought and judgment   Cardiovascular Rate: normal. Rhythm: regular. Heart sounds: normal.   Pulm/Chest: Effort normal, breath sounds normal.       REVIEWED DATA     Labs:     Lab Results   Component Value Date     12/08/2021    K 4.2 12/08/2021    CALCIUM 9.2 12/08/2021    AST 17 12/08/2021    ALT 16 12/08/2021    BUN 14 12/08/2021    CREATININE 1.29 (H) 12/08/2021    CREATININE 1.17 02/10/2021    CREATININE 1.35 (H) 01/13/2021    EGFRIFNONA 59 (L) 12/08/2021    EGFRIFAFRI 69 12/08/2021       Lab Results   Component Value Date    HGBA1C 6.1 (H) 12/08/2021    HGBA1C 5.60 01/13/2021    HGBA1C 5.60 07/10/2020       Lab Results   Component Value Date    LDL 70 12/08/2021    LDL 47 01/13/2021    HDL 69 12/08/2021    TRIG 95 12/08/2021       Lab Results   Component Value Date    TSH 3.300 12/08/2021    TSH 2.010 05/23/2019    FREET4 0.94 12/08/2021    FREET4 1.02 05/23/2019       Lab Results   Component Value Date    WBC 6.1 12/08/2021    HGB 11.7 (L) 12/08/2021     12/08/2021       Lab Results   Component Value Date    MICROALBUR <3.0 02/24/2017           Imaging:           Medical Tests:           Summary of old records / correspondence / consultant report:           Request outside records:             *Examiner was wearing KN95 mask and eye protection during the entire duration of the visit. Patient was masked the entire time. Minimum social distance of 6 ft maintained entire visit except if physical contact was necessary as documented.       Template created by Trey Torres MD

## 2022-05-19 LAB
ALBUMIN SERPL-MCNC: 4.6 G/DL (ref 3.8–4.8)
ALBUMIN/GLOB SERPL: 1.6 {RATIO} (ref 1.2–2.2)
ALP SERPL-CCNC: 73 IU/L (ref 44–121)
ALT SERPL-CCNC: 19 IU/L (ref 0–44)
AST SERPL-CCNC: 20 IU/L (ref 0–40)
BILIRUB SERPL-MCNC: 0.6 MG/DL (ref 0–1.2)
BUN SERPL-MCNC: 16 MG/DL (ref 8–27)
BUN/CREAT SERPL: 13 (ref 10–24)
CALCIUM SERPL-MCNC: 9.8 MG/DL (ref 8.6–10.2)
CHLORIDE SERPL-SCNC: 101 MMOL/L (ref 96–106)
CO2 SERPL-SCNC: 23 MMOL/L (ref 20–29)
CREAT SERPL-MCNC: 1.25 MG/DL (ref 0.76–1.27)
EGFRCR SERPLBLD CKD-EPI 2021: 66 ML/MIN/1.73
GLOBULIN SER CALC-MCNC: 2.9 G/DL (ref 1.5–4.5)
GLUCOSE SERPL-MCNC: 93 MG/DL (ref 65–99)
HBA1C MFR BLD: 6 % (ref 4.8–5.6)
POTASSIUM SERPL-SCNC: 4.1 MMOL/L (ref 3.5–5.2)
PROT SERPL-MCNC: 7.5 G/DL (ref 6–8.5)
SODIUM SERPL-SCNC: 139 MMOL/L (ref 134–144)

## 2022-05-20 DIAGNOSIS — G47.33 OSA (OBSTRUCTIVE SLEEP APNEA): ICD-10-CM

## 2022-05-20 DIAGNOSIS — R73.03 PREDIABETES: ICD-10-CM

## 2022-05-20 DIAGNOSIS — E66.01 CLASS 2 SEVERE OBESITY DUE TO EXCESS CALORIES WITH SERIOUS COMORBIDITY AND BODY MASS INDEX (BMI) OF 39.0 TO 39.9 IN ADULT: Primary | ICD-10-CM

## 2022-05-20 DIAGNOSIS — I10 ESSENTIAL HYPERTENSION: ICD-10-CM

## 2022-05-20 RX ORDER — SEMAGLUTIDE 0.25 MG/.5ML
0.25 INJECTION, SOLUTION SUBCUTANEOUS WEEKLY
Qty: 2 ML | Refills: 0 | Status: SHIPPED | OUTPATIENT
Start: 2022-05-20 | End: 2022-11-19

## 2022-06-16 ENCOUNTER — OFFICE VISIT (OUTPATIENT)
Dept: SLEEP MEDICINE | Facility: HOSPITAL | Age: 62
End: 2022-06-16

## 2022-06-16 VITALS — BODY MASS INDEX: 38.28 KG/M2 | HEIGHT: 66 IN | WEIGHT: 238.2 LBS | HEART RATE: 93 BPM | OXYGEN SATURATION: 98 %

## 2022-06-16 DIAGNOSIS — E66.01 CLASS 2 SEVERE OBESITY DUE TO EXCESS CALORIES WITH SERIOUS COMORBIDITY AND BODY MASS INDEX (BMI) OF 38.0 TO 38.9 IN ADULT: ICD-10-CM

## 2022-06-16 DIAGNOSIS — G47.33 OSA (OBSTRUCTIVE SLEEP APNEA): Primary | ICD-10-CM

## 2022-06-16 DIAGNOSIS — G47.36 HYPOXEMIA ASSOCIATED WITH SLEEP: ICD-10-CM

## 2022-06-16 PROCEDURE — 99204 OFFICE O/P NEW MOD 45 MIN: CPT | Performed by: INTERNAL MEDICINE

## 2022-06-16 PROCEDURE — G0463 HOSPITAL OUTPT CLINIC VISIT: HCPCS

## 2022-06-16 NOTE — PROGRESS NOTES
[facility] New Patient/Consultation       Reason for Consultation: JULIO CESAR    Patient Care Team:  Bill Torres MD as PCP - General (Internal Medicine)  Juancarlos Jean MD as Consulting Physician (Sleep Medicine)    Chief complaint: JULIO CESAR    History of present illness:    Thank you for asking me to see your patient.  The patient is a 61 y.o. male who was previously diagnosed with obstructive sleep apnea treated with CPAP.  Overnight polysomnogram performed 10/31/2006.  Weight 280 pounds.  Severe JULIO CESAR with AHI 69.2 events per hour noted along with sleep-related hypoxia.  The patient reports that his weight has been greater than 300 pounds.  His weight decreased to 160 pounds.  He stopped using CPAP.  His weight has now increased to 238 pounds and he is again having symptoms.  The patient lives alone.  His mouth is minimally dry in the morning.  He does use the restroom 3 times during the nighttime.  [place] Sleepiness Scale is normal at 5.  He goes to bed at 10 PM and awakens between 6 and 9 AM.  He does not take any naps.    Review of Systems:    A complete review of systems was done and all were negative with the exception of the above    History:  Past Medical History:   Diagnosis Date   • Cataract     A small cataract has been noted on my left eye since 1987   • Diabetes mellitus (HCC) 2008    Elevated blood sugar noted on blood test.  Monitored and rem   • GERD (gastroesophageal reflux disease) 2018   • History of skin cancer    • JULIO CESAR (obstructive sleep apnea) 10/31/2006    Overnight polysomnogram.  Weight 280 pounds.  Severe JULIO CESAR with AHI 69.2 events per hour.  Low oxygen saturation 76% and sleep-related hypoxia identified.   • Plantar fasciitis 06/03/2021   • Seasonal allergies    ,   Past Surgical History:   Procedure Laterality Date   • CARPAL TUNNEL RELEASE     • COLONOSCOPY N/A 5/8/2017    Procedure: COLONOSCOPY into cecum with cold snare polypectomy;  Surgeon: Emmanuel Ayala MD;  Location:   "[facility];  Service:    • NASAL POLYP EXCISION     • SKIN CANCER EXCISION     ,   Family History   Problem Relation Age of Onset   • Dementia Mother 70   • Hypertension Mother    • Ulcers Father    • Dementia Father 80        nursing home   • Liver cancer Paternal Grandmother    • No Known Problems Sister    • No Known Problems Brother    • Dementia Maternal Grandmother    • Prostate cancer Maternal Grandfather    • Obesity Sister    • No Known Problems Sister    • Prostate cancer Maternal Uncle    • Dementia Maternal Uncle         had MVA with head injury   • Colon cancer Neg Hx    • Colon polyps Neg Hx    • Diabetes Neg Hx     and   Social History     Socioeconomic History   • Marital status: Single   • Number of children: 0   Tobacco Use   • Smoking status: Former Smoker     Packs/day: 0.50     Years: 20.00     Pack years: 10.00     Start date:      Quit date: 2000     Years since quittin.5   • Smokeless tobacco: Former User     Types: Snuff, Chew   Substance and Sexual Activity   • Alcohol use: Yes     Comment: 3 or less per week   • Drug use: No   • Sexual activity: Not Currently     Partners: Female     Comment: Abstinent for 20+ years     E-cigarette/Vaping     E-cigarette/Vaping Substances     E-cigarette/Vaping Devices        Social History: He is an .  He will have 3 of 5 caffeinated beverages a day    Allergies:  No known drug allergy     Medication Review: Prilosec, tamsulosin, rosuvastatin, HCTZ, metformin, and Claritin    Vital Signs:    Vitals:    22 0900   Pulse: 93   SpO2: 98%   Weight: 108 kg (238 lb 3.2 oz)   Height: 167.6 cm (65.98\")      Body mass index is 38.47 kg/m².  Neck Circumference: 17.5 inches      Physical Exam:    Constitutional:  Well developed 61 y.o. male that appears in no apparent distress.  Awake & oriented times 3.  Normal mood with normal recent and remote memory and normal judgement.  Eyes:  Conjunctivae normal.  Oropharynx: Moist mucous " membranes without exudate and a large tongue and class III Mallampati airway.  Patient wearing a facemask.  Neck: Trachea midline  Respiratory: Effort is not labored  Cardiovascular: Radial pulse regular  Musculoskeletal: Gait appears normal, no digital clubbing evident, no pre-tibial edema    Impression:   Previously diagnosed severe obstructive sleep apnea with sleep-related hypoxia by overnight polysomnogram 10/31/2006, weight 280 pounds.  The patient's lowest weight was 160 pounds.  Presently, his weight is 238 pounds.  The patient is demonstrating some symptoms and signs consistent with obstructive sleep apnea.    Plan:  Good sleep hygiene measures should be maintained.  Weight loss would be beneficial in this patient who class II severe obesity (Body mass index is 38.47 kg/m².)     Pathophysiology of JULIO CESAR described to the patient.  Cardiovascular complications of untreated JULIO CESAR also reviewed.      Based on the patient's past medical history and based on his recent weight gain, I would recommend and the patient is agreeable to proceed with a home sleep study.  The patient needs to be reevaluated to see if obstructive sleep apnea is redeveloped.  I answered all of the patient's questions.  Home sleep study will be scheduled.  Follow-up will be determined based on the results of the home sleep study.    Thank you for requesting me to assist in this patient's care.    Juancarlos Jean MD  Sleep Medicine  06/22/22  07:52 EDT

## 2022-06-20 PROBLEM — E66.812 CLASS 2 SEVERE OBESITY DUE TO EXCESS CALORIES WITH SERIOUS COMORBIDITY AND BODY MASS INDEX (BMI) OF 38.0 TO 38.9 IN ADULT: Status: ACTIVE | Noted: 2019-05-23

## 2022-06-20 PROBLEM — G47.33 OSA (OBSTRUCTIVE SLEEP APNEA): Status: ACTIVE | Noted: 2022-06-20

## 2022-06-20 PROBLEM — E66.01 CLASS 2 SEVERE OBESITY DUE TO EXCESS CALORIES WITH SERIOUS COMORBIDITY AND BODY MASS INDEX (BMI) OF 38.0 TO 38.9 IN ADULT: Status: ACTIVE | Noted: 2019-05-23

## 2022-06-22 PROBLEM — G47.36 HYPOXEMIA ASSOCIATED WITH SLEEP: Status: ACTIVE | Noted: 2022-06-22

## 2022-06-28 ENCOUNTER — PREP FOR SURGERY (OUTPATIENT)
Dept: OTHER | Facility: HOSPITAL | Age: 62
End: 2022-06-28

## 2022-06-28 DIAGNOSIS — Z86.010 PERSONAL HISTORY OF COLONIC POLYPS: Primary | ICD-10-CM

## 2022-06-28 RX ORDER — SODIUM CHLORIDE, SODIUM LACTATE, POTASSIUM CHLORIDE, CALCIUM CHLORIDE 600; 310; 30; 20 MG/100ML; MG/100ML; MG/100ML; MG/100ML
30 INJECTION, SOLUTION INTRAVENOUS CONTINUOUS
Status: CANCELLED | OUTPATIENT
Start: 2022-10-27

## 2022-07-18 PROBLEM — Z86.0100 PERSONAL HISTORY OF COLONIC POLYPS: Status: ACTIVE | Noted: 2022-07-18

## 2022-07-18 PROBLEM — Z86.010 PERSONAL HISTORY OF COLONIC POLYPS: Status: ACTIVE | Noted: 2022-07-18

## 2022-08-03 ENCOUNTER — HOSPITAL ENCOUNTER (OUTPATIENT)
Dept: SLEEP MEDICINE | Facility: HOSPITAL | Age: 62
Discharge: HOME | End: 2022-08-03
Admitting: INTERNAL MEDICINE

## 2022-08-03 DIAGNOSIS — G47.33 OSA (OBSTRUCTIVE SLEEP APNEA): ICD-10-CM

## 2022-08-03 PROCEDURE — 95806 SLEEP STUDY UNATT&RESP EFFT: CPT

## 2022-08-03 PROCEDURE — 95806 SLEEP STUDY UNATT&RESP EFFT: CPT | Performed by: INTERNAL MEDICINE

## 2022-08-18 ENCOUNTER — TELEPHONE (OUTPATIENT)
Dept: SLEEP MEDICINE | Facility: HOSPITAL | Age: 62
End: 2022-08-18

## 2022-08-22 ENCOUNTER — TELEPHONE (OUTPATIENT)
Dept: SLEEP MEDICINE | Facility: HOSPITAL | Age: 62
End: 2022-08-22

## 2022-08-22 NOTE — TELEPHONE ENCOUNTER
Spoke with patient about sleep study results , sending orders to [facility] , pt will schedule follow up once set up on CPAP

## 2022-10-03 DIAGNOSIS — E78.5 HYPERLIPIDEMIA, UNSPECIFIED HYPERLIPIDEMIA TYPE: Chronic | ICD-10-CM

## 2022-10-03 RX ORDER — ROSUVASTATIN CALCIUM 10 MG/1
TABLET, COATED ORAL
Qty: 90 TABLET | Refills: 0 | Status: SHIPPED | OUTPATIENT
Start: 2022-10-03 | End: 2023-01-03

## 2022-10-27 ENCOUNTER — HOSPITAL ENCOUNTER (OUTPATIENT)
Facility: HOSPITAL | Age: 62
Setting detail: HOSPITAL OUTPATIENT SURGERY
Discharge: HOME | End: 2022-10-27
Attending: INTERNAL MEDICINE | Admitting: INTERNAL MEDICINE

## 2022-10-27 ENCOUNTER — ANESTHESIA EVENT (OUTPATIENT)
Dept: GASTROENTEROLOGY | Facility: HOSPITAL | Age: 62
End: 2022-10-27

## 2022-10-27 ENCOUNTER — ANESTHESIA (OUTPATIENT)
Dept: GASTROENTEROLOGY | Facility: HOSPITAL | Age: 62
End: 2022-10-27

## 2022-10-27 VITALS
OXYGEN SATURATION: 98 % | TEMPERATURE: 98.2 F | WEIGHT: 244.9 LBS | RESPIRATION RATE: 16 BRPM | BODY MASS INDEX: 39.36 KG/M2 | SYSTOLIC BLOOD PRESSURE: 125 MMHG | HEIGHT: 66 IN | HEART RATE: 70 BPM | DIASTOLIC BLOOD PRESSURE: 87 MMHG

## 2022-10-27 DIAGNOSIS — Z86.010 PERSONAL HISTORY OF COLONIC POLYPS: ICD-10-CM

## 2022-10-27 LAB — GLUCOSE BLDC GLUCOMTR-MCNC: 90 MG/DL (ref 70–130)

## 2022-10-27 PROCEDURE — 25010000002 PROPOFOL 10 MG/ML EMULSION: Performed by: NURSE ANESTHETIST, CERTIFIED REGISTERED

## 2022-10-27 PROCEDURE — 88305 TISSUE EXAM BY PATHOLOGIST: CPT | Performed by: INTERNAL MEDICINE

## 2022-10-27 PROCEDURE — 45385 COLONOSCOPY W/LESION REMOVAL: CPT | Performed by: INTERNAL MEDICINE

## 2022-10-27 PROCEDURE — 82962 GLUCOSE BLOOD TEST: CPT

## 2022-10-27 RX ORDER — SODIUM CHLORIDE 0.9 % (FLUSH) 0.9 %
10 SYRINGE (ML) INJECTION EVERY 12 HOURS SCHEDULED
Status: DISCONTINUED | OUTPATIENT
Start: 2022-10-27 | End: 2022-10-27 | Stop reason: HOSPADM

## 2022-10-27 RX ORDER — IBUPROFEN 600 MG/1
600 TABLET ORAL ONCE AS NEEDED
Status: DISCONTINUED | OUTPATIENT
Start: 2022-10-27 | End: 2022-10-27 | Stop reason: HOSPADM

## 2022-10-27 RX ORDER — EPHEDRINE SULFATE 50 MG/ML
5 INJECTION, SOLUTION INTRAVENOUS ONCE AS NEEDED
Status: DISCONTINUED | OUTPATIENT
Start: 2022-10-27 | End: 2022-10-27 | Stop reason: HOSPADM

## 2022-10-27 RX ORDER — LABETALOL HYDROCHLORIDE 5 MG/ML
5 INJECTION, SOLUTION INTRAVENOUS
Status: DISCONTINUED | OUTPATIENT
Start: 2022-10-27 | End: 2022-10-27 | Stop reason: HOSPADM

## 2022-10-27 RX ORDER — NALOXONE HCL 0.4 MG/ML
0.2 VIAL (ML) INJECTION AS NEEDED
Status: DISCONTINUED | OUTPATIENT
Start: 2022-10-27 | End: 2022-10-27 | Stop reason: HOSPADM

## 2022-10-27 RX ORDER — FLUMAZENIL 0.1 MG/ML
0.2 INJECTION INTRAVENOUS AS NEEDED
Status: DISCONTINUED | OUTPATIENT
Start: 2022-10-27 | End: 2022-10-27 | Stop reason: HOSPADM

## 2022-10-27 RX ORDER — SODIUM CHLORIDE, SODIUM LACTATE, POTASSIUM CHLORIDE, CALCIUM CHLORIDE 600; 310; 30; 20 MG/100ML; MG/100ML; MG/100ML; MG/100ML
30 INJECTION, SOLUTION INTRAVENOUS CONTINUOUS
Status: DISCONTINUED | OUTPATIENT
Start: 2022-10-27 | End: 2022-10-27 | Stop reason: HOSPADM

## 2022-10-27 RX ORDER — PROPOFOL 10 MG/ML
VIAL (ML) INTRAVENOUS AS NEEDED
Status: DISCONTINUED | OUTPATIENT
Start: 2022-10-27 | End: 2022-10-27 | Stop reason: SURG

## 2022-10-27 RX ORDER — PROMETHAZINE HYDROCHLORIDE 25 MG/1
25 SUPPOSITORY RECTAL ONCE AS NEEDED
Status: DISCONTINUED | OUTPATIENT
Start: 2022-10-27 | End: 2022-10-27 | Stop reason: HOSPADM

## 2022-10-27 RX ORDER — DIPHENHYDRAMINE HYDROCHLORIDE 50 MG/ML
12.5 INJECTION INTRAMUSCULAR; INTRAVENOUS
Status: DISCONTINUED | OUTPATIENT
Start: 2022-10-27 | End: 2022-10-27 | Stop reason: HOSPADM

## 2022-10-27 RX ORDER — LIDOCAINE HYDROCHLORIDE 20 MG/ML
INJECTION, SOLUTION INFILTRATION; PERINEURAL AS NEEDED
Status: DISCONTINUED | OUTPATIENT
Start: 2022-10-27 | End: 2022-10-27 | Stop reason: SURG

## 2022-10-27 RX ORDER — FENTANYL CITRATE 50 UG/ML
50 INJECTION, SOLUTION INTRAMUSCULAR; INTRAVENOUS
Status: DISCONTINUED | OUTPATIENT
Start: 2022-10-27 | End: 2022-10-27 | Stop reason: HOSPADM

## 2022-10-27 RX ORDER — SODIUM CHLORIDE 0.9 % (FLUSH) 0.9 %
10 SYRINGE (ML) INJECTION AS NEEDED
Status: DISCONTINUED | OUTPATIENT
Start: 2022-10-27 | End: 2022-10-27 | Stop reason: HOSPADM

## 2022-10-27 RX ORDER — DIPHENHYDRAMINE HCL 25 MG
25 CAPSULE ORAL
Status: DISCONTINUED | OUTPATIENT
Start: 2022-10-27 | End: 2022-10-27 | Stop reason: HOSPADM

## 2022-10-27 RX ORDER — SODIUM CHLORIDE, SODIUM LACTATE, POTASSIUM CHLORIDE, CALCIUM CHLORIDE 600; 310; 30; 20 MG/100ML; MG/100ML; MG/100ML; MG/100ML
INJECTION, SOLUTION INTRAVENOUS CONTINUOUS PRN
Status: DISCONTINUED | OUTPATIENT
Start: 2022-10-27 | End: 2022-10-27 | Stop reason: SURG

## 2022-10-27 RX ORDER — PROMETHAZINE HYDROCHLORIDE 25 MG/1
25 TABLET ORAL ONCE AS NEEDED
Status: DISCONTINUED | OUTPATIENT
Start: 2022-10-27 | End: 2022-10-27 | Stop reason: HOSPADM

## 2022-10-27 RX ORDER — HYDROCODONE BITARTRATE AND ACETAMINOPHEN 7.5; 325 MG/1; MG/1
1 TABLET ORAL ONCE AS NEEDED
Status: DISCONTINUED | OUTPATIENT
Start: 2022-10-27 | End: 2022-10-27 | Stop reason: HOSPADM

## 2022-10-27 RX ORDER — HYDROMORPHONE HCL 110MG/55ML
0.5 PATIENT CONTROLLED ANALGESIA SYRINGE INTRAVENOUS
Status: DISCONTINUED | OUTPATIENT
Start: 2022-10-27 | End: 2022-10-27 | Stop reason: HOSPADM

## 2022-10-27 RX ORDER — PROPOFOL 10 MG/ML
VIAL (ML) INTRAVENOUS CONTINUOUS PRN
Status: DISCONTINUED | OUTPATIENT
Start: 2022-10-27 | End: 2022-10-27 | Stop reason: SURG

## 2022-10-27 RX ORDER — OXYCODONE AND ACETAMINOPHEN 7.5; 325 MG/1; MG/1
1 TABLET ORAL EVERY 4 HOURS PRN
Status: DISCONTINUED | OUTPATIENT
Start: 2022-10-27 | End: 2022-10-27 | Stop reason: HOSPADM

## 2022-10-27 RX ORDER — HYDRALAZINE HYDROCHLORIDE 20 MG/ML
5 INJECTION INTRAMUSCULAR; INTRAVENOUS
Status: DISCONTINUED | OUTPATIENT
Start: 2022-10-27 | End: 2022-10-27 | Stop reason: HOSPADM

## 2022-10-27 RX ORDER — ONDANSETRON 2 MG/ML
4 INJECTION INTRAMUSCULAR; INTRAVENOUS ONCE AS NEEDED
Status: DISCONTINUED | OUTPATIENT
Start: 2022-10-27 | End: 2022-10-27 | Stop reason: HOSPADM

## 2022-10-27 RX ADMIN — Medication 150 MCG/KG/MIN: at 11:20

## 2022-10-27 RX ADMIN — SODIUM CHLORIDE, POTASSIUM CHLORIDE, SODIUM LACTATE AND CALCIUM CHLORIDE: 600; 310; 30; 20 INJECTION, SOLUTION INTRAVENOUS at 11:17

## 2022-10-27 RX ADMIN — SODIUM CHLORIDE, POTASSIUM CHLORIDE, SODIUM LACTATE AND CALCIUM CHLORIDE 30 ML/HR: 600; 310; 30; 20 INJECTION, SOLUTION INTRAVENOUS at 10:37

## 2022-10-27 RX ADMIN — PROPOFOL 100 MG: 10 INJECTION, EMULSION INTRAVENOUS at 11:24

## 2022-10-27 RX ADMIN — LIDOCAINE HYDROCHLORIDE 60 MG: 20 INJECTION, SOLUTION INFILTRATION; PERINEURAL at 11:20

## 2022-10-27 NOTE — ANESTHESIA POSTPROCEDURE EVALUATION
"Patient: Junior Estrada Jr.    Procedure Summary     Date: 10/27/22 Room / Location: [facility] ENDOSCOPY 10 / [facility]    Anesthesia Start: 1117 Anesthesia Stop: 1147    Procedure: COLONOSCOPY INTO CECUM WITH COLD SNARE POLYPECTOMIES Diagnosis:       Personal history of colonic polyps      (Personal history of colonic polyps [Z86.010])    Surgeons: Danny Mcwilliams MD Provider: Jason Son MD    Anesthesia Type: MAC ASA Status: 3          Anesthesia Type: MAC    Vitals  Vitals Value Taken Time   /87 10/27/22 1206   Temp     Pulse 70 10/27/22 1206   Resp 16 10/27/22 1206   SpO2 98 % 10/27/22 1206           Post Anesthesia Care and Evaluation    Patient location during evaluation: PHASE II  Patient participation: complete - patient participated  Level of consciousness: awake  Pain management: adequate    Airway patency: patent  Anesthetic complications: No anesthetic complications    Cardiovascular status: acceptable  Respiratory status: acceptable  Hydration status: acceptable    Comments: /87   Pulse 70   Temp 36.8 °C (98.2 °F) (Oral)   Resp 16   Ht 167.6 cm (66\")   Wt 111 kg (244 lb 14.4 oz)   SpO2 98%   BMI 39.53 kg/m²       "

## 2022-10-27 NOTE — ANESTHESIA PREPROCEDURE EVALUATION
Anesthesia Evaluation     Patient summary reviewed and Nursing notes reviewed   NPO Solid Status: > 8 hours  NPO Liquid Status: > 6 hours           Airway   Mallampati: II  TM distance: <3 FB  Neck ROM: full  Possible difficult intubation  Dental - normal exam     Pulmonary - normal exam   (+) a smoker Former, sleep apnea on CPAP,   Cardiovascular - normal exam    Rhythm: regular  Rate: normal    (+) hypertension less than 2 medications, hyperlipidemia,       Neuro/Psych  GI/Hepatic/Renal/Endo    (+) obesity, morbid obesity, GERD,  diabetes mellitus type 2,     Musculoskeletal     Abdominal   (+) obese,    Substance History      OB/GYN          Other                      Anesthesia Plan    ASA 3     MAC     (Suggest POM mask due to morbid obesity and JULIO CESAR)  intravenous induction     Anesthetic plan, risks, benefits, and alternatives have been provided, discussed and informed consent has been obtained with: patient.    Plan discussed with CRNA.        CODE STATUS:

## 2022-10-28 LAB
LAB AP CASE REPORT: NORMAL
PATH REPORT.FINAL DX SPEC: NORMAL
PATH REPORT.GROSS SPEC: NORMAL

## 2022-11-04 DIAGNOSIS — K21.9 GASTROESOPHAGEAL REFLUX DISEASE WITHOUT ESOPHAGITIS: ICD-10-CM

## 2022-11-04 RX ORDER — OMEPRAZOLE 20 MG/1
CAPSULE, DELAYED RELEASE ORAL
Qty: 90 CAPSULE | Refills: 3 | Status: SHIPPED | OUTPATIENT
Start: 2022-11-04

## 2022-11-16 ENCOUNTER — OFFICE VISIT (OUTPATIENT)
Dept: SLEEP MEDICINE | Facility: HOSPITAL | Age: 62
End: 2022-11-16

## 2022-11-16 VITALS — WEIGHT: 247 LBS | HEIGHT: 66 IN | HEART RATE: 74 BPM | OXYGEN SATURATION: 98 % | BODY MASS INDEX: 39.7 KG/M2

## 2022-11-16 DIAGNOSIS — E66.01 CLASS 2 SEVERE OBESITY DUE TO EXCESS CALORIES WITH SERIOUS COMORBIDITY AND BODY MASS INDEX (BMI) OF 39.0 TO 39.9 IN ADULT: ICD-10-CM

## 2022-11-16 DIAGNOSIS — G47.33 OSA (OBSTRUCTIVE SLEEP APNEA): Primary | Chronic | ICD-10-CM

## 2022-11-16 DIAGNOSIS — G47.36 HYPOXEMIA ASSOCIATED WITH SLEEP: ICD-10-CM

## 2022-11-16 PROCEDURE — G0463 HOSPITAL OUTPT CLINIC VISIT: HCPCS

## 2022-11-16 PROCEDURE — 99213 OFFICE O/P EST LOW 20 MIN: CPT | Performed by: INTERNAL MEDICINE

## 2022-11-16 NOTE — PROGRESS NOTES
"Follow Up [facility] Note     Chief Complaint:  JULIO CESAR     Primary Care Physician: Bill Torres MD    Interval History:   The patient is a 62 y.o. male  who I last saw 2022 and that note was reviewed.  The patient has known obstructive sleep apnea and has not been treated with CPAP.  He had lost weight.  However, due to some weight gain, reevaluation indicated.  Home sleep study performed 8/3/2022 and severe JULIO CESAR with sleep-related hypoxia identified.  The patient was restarted on ResMed auto CPAP between 10 and 20 cm water pressure and is here today for follow-up.  He states he is improved.  He goes to bed at 10 PM and reads for 30 minutes and then falls asleep.  He awakens at 6 AM.  He will use the bathroom.    Review of Systems:    A complete review of systems was done and all were negative with the exception of some nasal congestion    Social History:    Social History     Socioeconomic History   • Marital status: Single   • Number of children: 0   Tobacco Use   • Smoking status: Former     Packs/day: 0.50     Years: 20.00     Pack years: 10.00     Types: Cigarettes     Start date:      Quit date: 2000     Years since quittin.9   • Smokeless tobacco: Former     Types: Snuff, Chew   Substance and Sexual Activity   • Alcohol use: Yes     Alcohol/week: 1.0 - 5.0 standard drink     Types: 1 - 5 Cans of beer per week     Comment: Occasional drinker.   • Drug use: No   • Sexual activity: Not Currently     Partners: Female     Birth control/protection: None     Comment: Abstinent for 20+ years       Allergies:  No known drug allergy     Medication Review:  Reviewed.      Vital Signs:    Vitals:    22 0851   Pulse: 74   SpO2: 98%   Weight: 112 kg (247 lb)   Height: 167.6 cm (66\")     Body mass index is 39.87 kg/m².    Physical Exam:    Constitutional:  Well developed 62 y.o. male that appears in no apparent distress.  Awake & oriented times 3.  Normal mood with normal recent and remote " memory and normal judgement.  Eyes:  Conjunctivae normal.  Oropharynx: Previously, moist mucous membranes without exudate and a large tongue and class III Mallampati airway, patient is wearing a facemask.    Self-administered [place] Sleepiness Scale test results: 5  0-5 Lower normal daytime sleepiness  6-10 Higher normal daytime sleepiness  11-12 Mild, 13-15 Moderate, & 16-24 Severe excessive daytime sleepiness     Downloaded PAP Data Reviewed For Compliance:  DME is [facility] and he uses a fullface mask.  Downloads between 9/21 and 11/14/2022 compliance 100%.  Average usage is 6 hours and 58 minutes.  Average AHI is mildly abnormal at 7.8 but all subsets are normal and he has no significant leak.  Average auto CPAP pressure is 14.3 and his ResMed auto CPAP is 10-20    I have reviewed the above results and compared them with the patient's last downloads and reviewed with the patient.    Impression:   Severe obstructive sleep apnea with sleep-related hypoxia by home sleep study 8/3/2022, weight 238 pounds, adequately treated with ResMed auto CPAP. The patient appears to be at goal with good compliance and usage. The patient has no complaints of hypersomnolence.    Plan:  Good sleep hygiene measures should be maintained.  Weight loss would be beneficial in this patient who has class II severe obesity by Body mass index is 39.87 kg/m²..      After evaluating the patient and assessing results available, the patient is benefiting from the treatment being provided.     The patient will continue ResMed auto CPAP.  After clinical evaluation and review of downloads, I recommend changes to the patient's pressures.  Based on downloads, auto CPAP will be changed to 11-16 cm water pressure.  A new prescription will be sent to the patient's DME.    I answered all of the patient's questions.  The patient will call for any problems and will follow up in 6 months.      Juancarlos Jean MD  Sleep Medicine  11/19/22  10:53  EST

## 2022-11-19 PROBLEM — G47.33 OSA (OBSTRUCTIVE SLEEP APNEA): Status: RESOLVED | Noted: 2022-06-20 | Resolved: 2022-11-19

## 2022-11-21 ENCOUNTER — OFFICE VISIT (OUTPATIENT)
Dept: INTERNAL MEDICINE | Age: 62
End: 2022-11-21

## 2022-11-21 VITALS
BODY MASS INDEX: 40.34 KG/M2 | OXYGEN SATURATION: 99 % | TEMPERATURE: 97.1 F | SYSTOLIC BLOOD PRESSURE: 122 MMHG | DIASTOLIC BLOOD PRESSURE: 84 MMHG | WEIGHT: 251 LBS | HEIGHT: 66 IN | HEART RATE: 68 BPM

## 2022-11-21 DIAGNOSIS — E78.2 MIXED HYPERLIPIDEMIA: Chronic | ICD-10-CM

## 2022-11-21 DIAGNOSIS — R73.03 PREDIABETES: Primary | Chronic | ICD-10-CM

## 2022-11-21 DIAGNOSIS — I10 ESSENTIAL HYPERTENSION: Chronic | ICD-10-CM

## 2022-11-21 DIAGNOSIS — E66.01 MORBID OBESITY WITH BMI OF 40.0-44.9, ADULT: Chronic | ICD-10-CM

## 2022-11-21 PROBLEM — D64.89 OTHER SPECIFIED ANEMIAS: Status: RESOLVED | Noted: 2022-01-12 | Resolved: 2022-11-21

## 2022-11-21 LAB
ALBUMIN SERPL-MCNC: 4.1 G/DL (ref 3.5–5.2)
ALBUMIN/GLOB SERPL: 1.6 G/DL
ALP SERPL-CCNC: 65 U/L (ref 39–117)
ALT SERPL-CCNC: 10 U/L (ref 1–41)
AST SERPL-CCNC: 17 U/L (ref 1–40)
BILIRUB SERPL-MCNC: 0.5 MG/DL (ref 0–1.2)
BUN SERPL-MCNC: 15 MG/DL (ref 8–23)
BUN/CREAT SERPL: 12.6 (ref 7–25)
CALCIUM SERPL-MCNC: 9.1 MG/DL (ref 8.6–10.5)
CHLORIDE SERPL-SCNC: 100 MMOL/L (ref 98–107)
CHOLEST SERPL-MCNC: 139 MG/DL (ref 0–200)
CHOLEST/HDLC SERPL: 2.11 {RATIO}
CO2 SERPL-SCNC: 30.2 MMOL/L (ref 22–29)
CREAT SERPL-MCNC: 1.19 MG/DL (ref 0.76–1.27)
EGFRCR SERPLBLD CKD-EPI 2021: 69.1 ML/MIN/1.73
GLOBULIN SER CALC-MCNC: 2.5 GM/DL
GLUCOSE SERPL-MCNC: 97 MG/DL (ref 65–99)
HBA1C MFR BLD: 5.9 % (ref 4.8–5.6)
HDLC SERPL-MCNC: 66 MG/DL (ref 40–60)
LDLC SERPL CALC-MCNC: 48 MG/DL (ref 0–100)
POTASSIUM SERPL-SCNC: 4.3 MMOL/L (ref 3.5–5.2)
PROT SERPL-MCNC: 6.6 G/DL (ref 6–8.5)
SODIUM SERPL-SCNC: 136 MMOL/L (ref 136–145)
TRIGL SERPL-MCNC: 148 MG/DL (ref 0–150)
VLDLC SERPL CALC-MCNC: 25 MG/DL (ref 5–40)

## 2022-11-21 PROCEDURE — 90471 IMMUNIZATION ADMIN: CPT | Performed by: INTERNAL MEDICINE

## 2022-11-21 PROCEDURE — 99214 OFFICE O/P EST MOD 30 MIN: CPT | Performed by: INTERNAL MEDICINE

## 2022-11-21 PROCEDURE — 90686 IIV4 VACC NO PRSV 0.5 ML IM: CPT | Performed by: INTERNAL MEDICINE

## 2022-11-21 RX ORDER — CHOLECALCIFEROL (VITAMIN D3) 125 MCG
5 CAPSULE ORAL NIGHTLY PRN
COMMUNITY

## 2022-11-21 NOTE — ASSESSMENT & PLAN NOTE
Ongoing weight gain noted.    Maintain a low sugar/starch/carbohydrate diet.  Weight loss advised.  Discussed GLP-1 pending lab results.    Wt Readings from Last 3 Encounters:   11/21/22 114 kg (251 lb)   11/16/22 112 kg (247 lb)   10/27/22 111 kg (244 lb 14.4 oz)     Body mass index is 40.51 kg/m².

## 2022-11-21 NOTE — PROGRESS NOTES
I N T E R N A L  M E D I C I N E    J U N O H  K I M,  M D      ENCOUNTER DATE:  11/21/2022    Junior Rubiabbey [place]. / 62 y.o. / male    CHIEF COMPLAINT / REASON FOR OFFICE VISIT     Prediabetes, Hypertension, and Obesity      ASSESSMENT & PLAN     Problem List Items Addressed This Visit        High    Prediabetes - Primary (Chronic)    Current Assessment & Plan     Ongoing weight gain noted.  Check A1c level today.  If A1c is any higher he likely has type 2 diabetes.  Discussed GLP-1 therapy pending lab results.  At this time continue metformin  mg daily.         Relevant Medications    metFORMIN ER (GLUCOPHAGE-XR) 500 MG 24 hr tablet    Other Relevant Orders    Hemoglobin A1c       Medium    Hyperlipidemia (Chronic)    Overview     Continue rosuvastatin 10 mg qd.          Relevant Medications    rosuvastatin (CRESTOR) 10 MG tablet    Other Relevant Orders    Comprehensive Metabolic Panel    Lipid Panel With / Chol / HDL Ratio    Morbid obesity with BMI of 40.0-44.9, adult (HCC) (Chronic)    Current Assessment & Plan     Ongoing weight gain noted.    Maintain a low sugar/starch/carbohydrate diet.  Weight loss advised.  Discussed GLP-1 pending lab results.    Wt Readings from Last 3 Encounters:   11/21/22 114 kg (251 lb)   11/16/22 112 kg (247 lb)   10/27/22 111 kg (244 lb 14.4 oz)     Body mass index is 40.51 kg/m².              Essential hypertension (Chronic)    Overview     Continue HCTZ 12.5 mg qd         Relevant Medications    hydroCHLOROthiazide (HYDRODIURIL) 12.5 MG tablet    Other Relevant Orders    Comprehensive Metabolic Panel     Orders Placed This Encounter   Procedures   • FluLaval/Fluarix/Fluzone >6 Months   • Comprehensive Metabolic Panel   • Hemoglobin A1c   • Lipid Panel With / Chol / HDL Ratio     No orders of the defined types were placed in this encounter.      SUMMARY/DISCUSSION  •       Next Appointment with me: Visit date not found    Return in about 4 months (around 3/21/2023) for  "Hypertension, Hyperlipidemia, Prediabetes.        VITAL SIGNS     Vitals:    11/21/22 0738   BP: 122/84   Pulse: 68   Temp: 97.1 °F (36.2 °C)   SpO2: 99%   Weight: 114 kg (251 lb)   Height: 167.6 cm (66\")       BP Readings from Last 3 Encounters:   11/21/22 122/84   10/27/22 125/87   05/18/22 136/88     Wt Readings from Last 3 Encounters:   11/21/22 114 kg (251 lb)   11/16/22 112 kg (247 lb)   10/27/22 111 kg (244 lb 14.4 oz)     Body mass index is 40.51 kg/m².    Blood pressure readings recorded on patient flowsheet:  No flowsheet data found.     MEDICATIONS AT THE TIME OF OFFICE VISIT     Current Outpatient Medications on File Prior to Visit   Medication Sig   • hydroCHLOROthiazide (HYDRODIURIL) 12.5 MG tablet TAKE 1 TABLET DAILY   • loratadine (CLARITIN) 10 MG tablet Take 10 mg by mouth Daily.   • melatonin 5 MG tablet tablet Take 5 mg by mouth At Night As Needed.   • metFORMIN ER (GLUCOPHAGE-XR) 500 MG 24 hr tablet Take 1 tablet with dinner.   • omeprazole (priLOSEC) 20 MG capsule TAKE 1 CAPSULE DAILY   • rosuvastatin (CRESTOR) 10 MG tablet TAKE 1 TABLET DAILY   • tamsulosin (FLOMAX) 0.4 MG capsule 24 hr capsule TAKE 1 CAPSULE EVERY NIGHT     No current facility-administered medications on file prior to visit.         HISTORY OF PRESENT ILLNESS     Ongoing weight gain noted.  Is currently on metformin  mg 1 tablet daily for prediabetes and most recent A1c was 6.0.  Blood pressure and cholesterol remain controlled with medications without significant problems.  BMI currently stands at 40.51.  He is interested in GLP-1 therapy for weight loss.       Lab Results   Component Value Date    HGBA1C 6.0 (H) 05/18/2022    HGBA1C 6.1 (H) 12/08/2021    HGBA1C 5.60 01/13/2021    CREATININE 1.25 05/18/2022    LDL 70 12/08/2021     Blood sugar readings recorded on patient's flowsheet:  No flowsheet data found.    114 kg (251 lb)    REVIEW OF SYSTEMS     Weight gain   No chest pain, palpitations, [place]   GI negative "       PHYSICAL EXAMINATION     Physical Exam  Obese, weight gain noted  Alert with normal thought and judgment.   Cardiovascular: Normal rate, regular rhythm.   Trace bilateral lower extremities edema       REVIEWED DATA     Labs:       Lab Results   Component Value Date     05/18/2022    K 4.1 05/18/2022    CALCIUM 9.8 05/18/2022    AST 20 05/18/2022    ALT 19 05/18/2022    BUN 16 05/18/2022    CREATININE 1.25 05/18/2022    CREATININE 1.29 (H) 12/08/2021    CREATININE 1.17 02/10/2021    EGFRIFNONA 59 (L) 12/08/2021    EGFRIFAFRI 69 12/08/2021       Lab Results   Component Value Date    HGBA1C 6.0 (H) 05/18/2022    HGBA1C 6.1 (H) 12/08/2021    HGBA1C 5.60 01/13/2021       Lab Results   Component Value Date    LDL 70 12/08/2021    LDL 47 01/13/2021    LDL 30 02/25/2020    HDL 69 12/08/2021    HDL 64 (H) 01/13/2021    TRIG 95 12/08/2021    TRIG 98 01/13/2021       Lab Results   Component Value Date    TSH 3.300 12/08/2021    TSH 2.010 05/23/2019    FREET4 0.94 12/08/2021    FREET4 1.02 05/23/2019       Lab Results   Component Value Date    WBC 6.1 12/08/2021    HGB 11.7 (L) 12/08/2021     12/08/2021     Lab Results   Component Value Date    HGB 11.7 (L) 12/08/2021    HGB 14.8 01/04/2019    HGB 14.5 02/24/2017      No results found for: MALBCRERATIO       Imaging:           Medical Tests:           Summary of old records / correspondence / consultant report:           Request outside records:           *Examiner was wearing KN95 mask and eye protection during the entire duration of the visit. Patient was masked the entire time. Minimum social distance of 6 ft maintained entire visit except if physical contact was necessary as documented.       Template created by Trey Torres MD

## 2022-11-21 NOTE — ASSESSMENT & PLAN NOTE
Ongoing weight gain noted.  Check A1c level today.  If A1c is any higher he likely has type 2 diabetes.  Discussed GLP-1 therapy pending lab results.  At this time continue metformin  mg daily.

## 2023-01-02 DIAGNOSIS — E78.5 HYPERLIPIDEMIA, UNSPECIFIED HYPERLIPIDEMIA TYPE: Chronic | ICD-10-CM

## 2023-01-03 RX ORDER — ROSUVASTATIN CALCIUM 10 MG/1
TABLET, COATED ORAL
Qty: 90 TABLET | Refills: 3 | Status: SHIPPED | OUTPATIENT
Start: 2023-01-03

## 2023-01-11 DIAGNOSIS — I10 ESSENTIAL HYPERTENSION: ICD-10-CM

## 2023-01-11 RX ORDER — HYDROCHLOROTHIAZIDE 12.5 MG/1
TABLET ORAL
Qty: 90 TABLET | Refills: 3 | Status: SHIPPED | OUTPATIENT
Start: 2023-01-11

## 2023-01-23 DIAGNOSIS — N40.1 BENIGN PROSTATIC HYPERPLASIA WITH LOWER URINARY TRACT SYMPTOMS, SYMPTOM DETAILS UNSPECIFIED: ICD-10-CM

## 2023-01-23 RX ORDER — TAMSULOSIN HYDROCHLORIDE 0.4 MG/1
CAPSULE ORAL
Qty: 90 CAPSULE | Refills: 3 | Status: SHIPPED | OUTPATIENT
Start: 2023-01-23

## 2023-03-31 ENCOUNTER — OFFICE VISIT (OUTPATIENT)
Dept: INTERNAL MEDICINE | Age: 63
End: 2023-03-31
Payer: COMMERCIAL

## 2023-03-31 VITALS
HEIGHT: 66 IN | OXYGEN SATURATION: 99 % | DIASTOLIC BLOOD PRESSURE: 80 MMHG | TEMPERATURE: 97.1 F | WEIGHT: 248 LBS | HEART RATE: 76 BPM | SYSTOLIC BLOOD PRESSURE: 120 MMHG | BODY MASS INDEX: 39.86 KG/M2

## 2023-03-31 DIAGNOSIS — I10 ESSENTIAL HYPERTENSION: Chronic | ICD-10-CM

## 2023-03-31 DIAGNOSIS — E61.1 IRON DEFICIENCY: ICD-10-CM

## 2023-03-31 DIAGNOSIS — E66.01 MORBID OBESITY WITH BMI OF 40.0-44.9, ADULT: Chronic | ICD-10-CM

## 2023-03-31 DIAGNOSIS — E78.2 MIXED HYPERLIPIDEMIA: Chronic | ICD-10-CM

## 2023-03-31 DIAGNOSIS — R73.03 PREDIABETES: Primary | Chronic | ICD-10-CM

## 2023-03-31 LAB
BASOPHILS # BLD AUTO: 0.04 10*3/MM3 (ref 0–0.2)
BASOPHILS NFR BLD AUTO: 0.7 % (ref 0–1.5)
EOSINOPHIL # BLD AUTO: 0.2 10*3/MM3 (ref 0–0.4)
EOSINOPHIL NFR BLD AUTO: 3.7 % (ref 0.3–6.2)
ERYTHROCYTE [DISTWIDTH] IN BLOOD BY AUTOMATED COUNT: 17.4 % (ref 12.3–15.4)
FERRITIN SERPL-MCNC: 15.8 NG/ML (ref 30–400)
HBA1C MFR BLD: 6.1 % (ref 4.8–5.6)
HCT VFR BLD AUTO: 40 % (ref 37.5–51)
HGB BLD-MCNC: 12.3 G/DL (ref 13–17.7)
IMM GRANULOCYTES # BLD AUTO: 0.01 10*3/MM3 (ref 0–0.05)
IMM GRANULOCYTES NFR BLD AUTO: 0.2 % (ref 0–0.5)
IRON SATN MFR SERPL: 8 % (ref 20–50)
IRON SERPL-MCNC: 38 MCG/DL (ref 59–158)
LYMPHOCYTES # BLD AUTO: 1.5 10*3/MM3 (ref 0.7–3.1)
LYMPHOCYTES NFR BLD AUTO: 28 % (ref 19.6–45.3)
MCH RBC QN AUTO: 25.2 PG (ref 26.6–33)
MCHC RBC AUTO-ENTMCNC: 30.8 G/DL (ref 31.5–35.7)
MCV RBC AUTO: 81.8 FL (ref 79–97)
MONOCYTES # BLD AUTO: 0.69 10*3/MM3 (ref 0.1–0.9)
MONOCYTES NFR BLD AUTO: 12.9 % (ref 5–12)
NEUTROPHILS # BLD AUTO: 2.91 10*3/MM3 (ref 1.7–7)
NEUTROPHILS NFR BLD AUTO: 54.5 % (ref 42.7–76)
NRBC BLD AUTO-RTO: 0 /100 WBC (ref 0–0.2)
PLATELET # BLD AUTO: 222 10*3/MM3 (ref 140–450)
RBC # BLD AUTO: 4.89 10*6/MM3 (ref 4.14–5.8)
TIBC SERPL-MCNC: 466 MCG/DL
UIBC SERPL-MCNC: 428 MCG/DL (ref 112–346)
WBC # BLD AUTO: 5.35 10*3/MM3 (ref 3.4–10.8)

## 2023-03-31 PROCEDURE — 99214 OFFICE O/P EST MOD 30 MIN: CPT | Performed by: INTERNAL MEDICINE

## 2023-03-31 RX ORDER — SEMAGLUTIDE 0.25 MG/.5ML
0.25 INJECTION, SOLUTION SUBCUTANEOUS WEEKLY
Qty: 2 ML | Refills: 2 | Status: SHIPPED | OUTPATIENT
Start: 2023-03-31 | End: 2023-03-31 | Stop reason: SDUPTHER

## 2023-03-31 RX ORDER — SEMAGLUTIDE 0.25 MG/.5ML
0.25 INJECTION, SOLUTION SUBCUTANEOUS WEEKLY
Qty: 2 ML | Refills: 2 | Status: SHIPPED | OUTPATIENT
Start: 2023-03-31

## 2023-03-31 RX ORDER — DIPHENHYDRAMINE HCL 25 MG
25 CAPSULE ORAL NIGHTLY
COMMUNITY

## 2023-03-31 NOTE — ASSESSMENT & PLAN NOTE
Start Wegovy 0.25 mg weekly.     Advised to watch for significant GI side effects.     Follow-up in 3 months if he does start medication.     Wt Readings from Last 3 Encounters:   03/31/23 112 kg (248 lb)   11/21/22 114 kg (251 lb)   11/16/22 112 kg (247 lb)     Body mass index is 40.03 kg/m².

## 2023-03-31 NOTE — TELEPHONE ENCOUNTER
----- Message from Junior Estrada Jr. sent at 3/31/2023  1:21 PM EDT -----  Regarding: Wegovy order  Contact: [phone]  I confirmed, [facility] does have my insurance info

## 2023-03-31 NOTE — ASSESSMENT & PLAN NOTE
Starting Wegovy 0.25 mg weekly for morbid obesity.  Continue metformin.    Lab Results   Component Value Date    HGBA1C 5.90 (H) 11/21/2022    HGBA1C 6.0 (H) 05/18/2022    HGBA1C 6.1 (H) 12/08/2021

## 2023-03-31 NOTE — PROGRESS NOTES
I N T E R N A L  M E D I C I N E    J U N O H  K I M,  M D      ENCOUNTER DATE:  03/31/2023    Junior Estrada . / 62 y.o. / male    CHIEF COMPLAINT / REASON FOR OFFICE VISIT     Hypertension, Hyperlipidemia, and Prediabetes      ASSESSMENT & PLAN     Problem List Items Addressed This Visit        High    Morbid obesity with BMI of 40.0-44.9, adult (HCC) (Chronic)    Current Assessment & Plan     Start Wegovy 0.25 mg weekly.     Advised to watch for significant GI side effects.     Follow-up in 3 months if he does start medication.     Wt Readings from Last 3 Encounters:   03/31/23 112 kg (248 lb)   11/21/22 114 kg (251 lb)   11/16/22 112 kg (247 lb)     Body mass index is 40.03 kg/m².            Relevant Medications    Semaglutide-Weight Management (Wegovy) 0.25 MG/0.5ML solution auto-injector    Prediabetes - Primary (Chronic)    Current Assessment & Plan     Starting Wegovy 0.25 mg weekly for morbid obesity.  Continue metformin.    Lab Results   Component Value Date    HGBA1C 5.90 (H) 11/21/2022    HGBA1C 6.0 (H) 05/18/2022    HGBA1C 6.1 (H) 12/08/2021             Relevant Medications    metFORMIN ER (GLUCOPHAGE-XR) 500 MG 24 hr tablet    Other Relevant Orders    Hemoglobin A1c       Medium    Hyperlipidemia (Chronic)    Overview     Continue rosuvastatin 10 mg qd.          Relevant Medications    rosuvastatin (CRESTOR) 10 MG tablet    Essential hypertension (Chronic)    Overview     Continue HCTZ 12.5 mg qd         Relevant Medications    hydroCHLOROthiazide (HYDRODIURIL) 12.5 MG tablet   Other Visit Diagnoses     Iron deficiency        Relevant Orders    CBC & Differential    Ferritin    Iron Profile        Orders Placed This Encounter   Procedures   • Hemoglobin A1c   • Ferritin   • Iron Profile   • CBC & Differential     New Medications Ordered This Visit   Medications   • Semaglutide-Weight Management (Wegovy) 0.25 MG/0.5ML solution auto-injector     Sig: Inject 0.25 mg under the skin into the  "appropriate area as directed 1 (One) Time Per Week.     Dispense:  2 mL     Refill:  2       SUMMARY/DISCUSSION  •       Next Appointment with me: Visit date not found    Return in about 3 months (around 6/30/2023) for Reassess chronic medical problems.      VITAL SIGNS     Vitals:    03/31/23 0821   BP: 120/80   Pulse: 76   Temp: 97.1 °F (36.2 °C)   SpO2: 99%   Weight: 112 kg (248 lb)   Height: 167.6 cm (66\")       BP Readings from Last 3 Encounters:   03/31/23 120/80   11/21/22 122/84   10/27/22 125/87     Wt Readings from Last 3 Encounters:   03/31/23 112 kg (248 lb)   11/21/22 114 kg (251 lb)   11/16/22 112 kg (247 lb)     Body mass index is 40.03 kg/m².    Blood pressure readings recorded on patient flowsheet:  No flowsheet data found.       MEDICATIONS AT THE TIME OF OFFICE VISIT     Current Outpatient Medications on File Prior to Visit   Medication Sig   • diphenhydrAMINE (BENADRYL) 25 mg capsule Take 1 capsule by mouth Every Night.   • hydroCHLOROthiazide (HYDRODIURIL) 12.5 MG tablet TAKE 1 TABLET DAILY   • loratadine (CLARITIN) 10 MG tablet Take 1 tablet by mouth Daily.   • melatonin 5 MG tablet tablet Take 1 tablet by mouth At Night As Needed.   • metFORMIN ER (GLUCOPHAGE-XR) 500 MG 24 hr tablet Take 1 tablet with dinner.   • omeprazole (priLOSEC) 20 MG capsule TAKE 1 CAPSULE DAILY   • rosuvastatin (CRESTOR) 10 MG tablet TAKE 1 TABLET DAILY   • tamsulosin (FLOMAX) 0.4 MG capsule 24 hr capsule TAKE 1 CAPSULE EVERY NIGHT     No current facility-administered medications on file prior to visit.          HISTORY OF PRESENT ILLNESS     Previously he was not able to start Wegovy due to drug shortage.  He is willing to try medication again.  He is on metformin for prediabetes and most recent A1c level was less than 6.  His current weight is 248 pounds and BMI 40.03.  Hypertension remains stable on hydrochlorothiazide 12.5 mg.  He is on rosuvastatin 10 mg without problems for hyperlipidemia.      Patient Care " Team:  Bill Torres MD as PCP - General (Internal Medicine)    REVIEW OF SYSTEMS     Review of Systems   Constitutional neg except per HPI   Resp neg  CV neg   GI neg     PHYSICAL EXAMINATION     Physical Exam  General: No acute distress; obese   Psych: Normal thought and judgment   Cardiovascular Rate: normal. Rhythm: regular. Heart sounds: normal       REVIEWED DATA     Labs:     Lab Results   Component Value Date     11/21/2022    K 4.3 11/21/2022    CALCIUM 9.1 11/21/2022    AST 17 11/21/2022    ALT 10 11/21/2022    BUN 15 11/21/2022    CREATININE 1.19 11/21/2022    CREATININE 1.25 05/18/2022    CREATININE 1.29 (H) 12/08/2021    EGFRIFNONA 59 (L) 12/08/2021    EGFRIFAFRI 69 12/08/2021       Lab Results   Component Value Date    HGBA1C 5.90 (H) 11/21/2022    HGBA1C 6.0 (H) 05/18/2022    HGBA1C 6.1 (H) 12/08/2021       Lab Results   Component Value Date    LDL 48 11/21/2022    LDL 70 12/08/2021    LDL 47 01/13/2021    HDL 66 (H) 11/21/2022    HDL 69 12/08/2021    TRIG 148 11/21/2022    TRIG 95 12/08/2021       Lab Results   Component Value Date    TSH 3.300 12/08/2021    TSH 2.010 05/23/2019    FREET4 0.94 12/08/2021    FREET4 1.02 05/23/2019       Lab Results   Component Value Date    WBC 6.1 12/08/2021    HGB 11.7 (L) 12/08/2021     12/08/2021       No results found for: MALBCRERATIO       Imaging:           Medical Tests:           Summary of old records / correspondence / consultant report:           Request outside records:             *Examiner was wearing KN95 mask during the entire duration of the visit. Patient was masked the entire time. Minimum social distance of 6 ft maintained entire visit except if physical contact was necessary as documented.       Template created by Trey Torres MD

## 2023-04-19 DIAGNOSIS — R73.03 PREDIABETES: Chronic | ICD-10-CM

## 2023-04-19 RX ORDER — METFORMIN HYDROCHLORIDE 500 MG/1
TABLET, EXTENDED RELEASE ORAL
Qty: 90 TABLET | Refills: 3 | Status: SHIPPED | OUTPATIENT
Start: 2023-04-19

## 2023-04-24 DIAGNOSIS — E66.01 MORBID OBESITY WITH BMI OF 40.0-44.9, ADULT: Primary | Chronic | ICD-10-CM

## 2023-04-24 DIAGNOSIS — E66.01 MORBID OBESITY WITH BMI OF 40.0-44.9, ADULT: Chronic | ICD-10-CM

## 2023-04-24 RX ORDER — SEMAGLUTIDE 0.5 MG/.5ML
0.5 INJECTION, SOLUTION SUBCUTANEOUS WEEKLY
Qty: 2 ML | Refills: 0 | Status: SHIPPED | OUTPATIENT
Start: 2023-04-24 | End: 2023-04-24 | Stop reason: SDUPTHER

## 2023-04-24 RX ORDER — SEMAGLUTIDE 0.5 MG/.5ML
0.5 INJECTION, SOLUTION SUBCUTANEOUS WEEKLY
Qty: 2 ML | Refills: 0 | Status: SHIPPED | OUTPATIENT
Start: 2023-04-24

## 2023-05-17 ENCOUNTER — OFFICE VISIT (OUTPATIENT)
Dept: SLEEP MEDICINE | Facility: HOSPITAL | Age: 63
End: 2023-05-17
Payer: COMMERCIAL

## 2023-05-17 VITALS — BODY MASS INDEX: 39.86 KG/M2 | HEART RATE: 77 BPM | OXYGEN SATURATION: 97 % | HEIGHT: 66 IN | WEIGHT: 248 LBS

## 2023-05-17 DIAGNOSIS — G47.33 OSA (OBSTRUCTIVE SLEEP APNEA): Primary | Chronic | ICD-10-CM

## 2023-05-17 DIAGNOSIS — G47.36 HYPOXEMIA ASSOCIATED WITH SLEEP: ICD-10-CM

## 2023-05-17 DIAGNOSIS — G47.14 HYPERSOMNIA DUE TO MEDICAL CONDITION: ICD-10-CM

## 2023-05-17 DIAGNOSIS — E66.01 CLASS 3 SEVERE OBESITY DUE TO EXCESS CALORIES WITH SERIOUS COMORBIDITY AND BODY MASS INDEX (BMI) OF 40.0 TO 44.9 IN ADULT: ICD-10-CM

## 2023-05-17 PROCEDURE — G0463 HOSPITAL OUTPT CLINIC VISIT: HCPCS

## 2023-05-17 NOTE — PROGRESS NOTES
"Follow Up [facility] Disorders [facility] Note     Chief Complaint:  JULIO CESAR     Primary Care Physician: Bill Torres MD    Interval History:   The patient is a 62 y.o. male  who I last saw 2022 and that note was reviewed.  The patient reports he is doing well without new complaints.  He goes to bed around 10 PM and gets out of bed around 6 AM.  He will use the bathroom during that time.    Review of Systems:    A complete review of systems was done and all were negative with the exception of the above    Social History:    Social History     Socioeconomic History   • Marital status: Single   • Number of children: 0   Tobacco Use   • Smoking status: Former     Packs/day: 0.50     Years: 20.00     Pack years: 10.00     Types: Cigarettes     Start date: 1980     Quit date: 2000     Years since quittin.4   • Smokeless tobacco: Former     Types: Snuff, Chew   Substance and Sexual Activity   • Alcohol use: Yes     Alcohol/week: 1.0 - 5.0 standard drink     Types: 1 - 5 Cans of beer per week     Comment: Occasional drinker.   • Drug use: No   • Sexual activity: Not Currently     Partners: Female     Birth control/protection: None     Comment: Abstinent for 20+ years       Allergies:  No known drug allergy     Medication Review: His list was reviewed.      Vital Signs:    Vitals:    23 0941   Pulse: 77   SpO2: 97%   Weight: 112 kg (248 lb)   Height: 167.6 cm (66\")     Body mass index is 40.03 kg/m².    Physical Exam:    Constitutional:  Well developed 62 y.o. male that appears in no apparent distress.  Awake & oriented times 3.  Normal mood with normal recent and remote memory and normal judgement.  Eyes:  Conjunctivae normal.  Oropharynx: Previously, moist mucous membranes without exudate and a large tongue and class III Mallampati airway.    Self-administered [place] Sleepiness Scale test results: 7, previously 5  0-5 Lower normal daytime sleepiness  6-10 Higher normal daytime sleepiness  11-12 Mild, 13-15 " Moderate, & 16-24 Severe excessive daytime sleepiness     Downloaded PAP Data Reviewed For Therapeutic Response and Compliance:  DME is [facility] and he uses a fullface mask.  Downloads between 2/15 and 5/15/2023 compliance 96%.  Average usage is 5 hours and 57 minutes.  Average AHI is mildly abnormal at 8.1 but all subsets are normal and he has no significant leak.  Average auto CPAP pressure is 15.7 and his ResMed auto CPAP is 11-16    I have reviewed the above results and compared them with the patient's last downloads and reviewed with the patient.    Impression:   Severe obstructive sleep apnea with sleep-related hypoxia by home sleep study 8/3/2022, weight 238 pounds, adequately treated with ResMed auto CPAP. The patient appears to be at goal with good compliance and usage. The patient has some complaints of hypersomnolence.    Plan:  Good sleep hygiene measures should be maintained.  Weight loss would be beneficial in this patient who has class III severe obesity by Body mass index is 40.03 kg/m²..      After evaluating the patient and assessing results available, the patient is benefiting from the treatment being provided.     The patient will continue ResMed auto CPAP.  Potential side effects of CPAP therapy reviewed and addressed as needed.  After clinical evaluation and review of downloads, I recommend changes to the patient's pressures.  Due to weight increase, recommend changing auto CPAP to 11-18.  A new prescription will be sent to the patient's DME.    I answered all of the patient's questions.  The patient will call the [facility] Disorder Center for any problems with side effects of CPAP therapy and will follow up in 9 months.      Juancarlos Jean MD  Sleep Medicine  05/17/23  09:51 EDT

## 2023-05-21 PROBLEM — E66.813 CLASS 3 SEVERE OBESITY DUE TO EXCESS CALORIES WITH SERIOUS COMORBIDITY AND BODY MASS INDEX (BMI) OF 40.0 TO 44.9 IN ADULT: Status: ACTIVE | Noted: 2019-05-23

## 2023-05-21 PROBLEM — G47.14 HYPERSOMNIA DUE TO MEDICAL CONDITION: Status: ACTIVE | Noted: 2023-05-21

## 2023-05-22 ENCOUNTER — TELEPHONE (OUTPATIENT)
Dept: SLEEP MEDICINE | Facility: HOSPITAL | Age: 63
End: 2023-05-22
Payer: COMMERCIAL

## 2023-05-22 DIAGNOSIS — E66.01 MORBID OBESITY WITH BMI OF 40.0-44.9, ADULT: Chronic | ICD-10-CM

## 2023-05-22 RX ORDER — SEMAGLUTIDE 0.5 MG/.5ML
1 INJECTION, SOLUTION SUBCUTANEOUS WEEKLY
Qty: 4 ML | Refills: 0 | Status: SHIPPED | OUTPATIENT
Start: 2023-05-22 | End: 2023-05-26 | Stop reason: DRUGHIGH

## 2023-05-22 NOTE — TELEPHONE ENCOUNTER
"----- Message from OCTAVIANO Rivera sent at 5/22/2023 11:36 AM EDT -----  Regarding: FW: Wegovy Progress  Contact: [phone]  Please send refill request.    ----- Message -----  From: Narda Tavarez MA  Sent: 5/22/2023  11:24 AM EDT  To: OCTAVIANO Rivera  Subject: FW: Wegovy Progress                                ----- Message -----  From: Junior Estrada Jr. \"Bill\"  Sent: 5/22/2023   7:45 AM EDT  To: alek  [facility] Clinical Pool  Subject: Wegovy Progress                                  Good Morning  I just took my last .5 dosage of Wegovy.  I'm still having no issues with it.  Can you please notify [facility] to refill the prescription and to adjust the dosage if appropriate.    Thank You       "

## 2023-05-26 ENCOUNTER — TELEPHONE (OUTPATIENT)
Dept: INTERNAL MEDICINE | Age: 63
End: 2023-05-26
Payer: COMMERCIAL

## 2023-05-26 DIAGNOSIS — E66.01 MORBID OBESITY WITH BMI OF 40.0-44.9, ADULT: Primary | ICD-10-CM

## 2023-05-26 RX ORDER — SEMAGLUTIDE 1 MG/.5ML
1 INJECTION, SOLUTION SUBCUTANEOUS WEEKLY
Qty: 2 ML | Refills: 0 | Status: SHIPPED | OUTPATIENT
Start: 2023-05-26

## 2023-05-26 NOTE — TELEPHONE ENCOUNTER
Pharmacy Name:  [id number] - [place], [place] - [address] AT [facility] - [phone]  - [phone]     Pharmacy representative name: DUSTIN    Pharmacy representative phone number: [phone]    What medication are you calling in regards to: Semaglutide-Weight Management (Wegovy) 0.5 MG/0.5ML solution auto-injector    What question does the pharmacy have: DUSTIN STATED THAT THE DIRECTIONS ARE TO INJECT 1 ML WHICH DOESN'T MATCH THE DOSE. DUSTIN STATED NEED TO KNOW IF THEY ARE GOING UP THE 1 MG FOR PATIENT.     Who is the provider that prescribed the medication: ARCELIA BLUME

## 2023-05-30 DIAGNOSIS — E66.01 CLASS 3 SEVERE OBESITY DUE TO EXCESS CALORIES WITH SERIOUS COMORBIDITY AND BODY MASS INDEX (BMI) OF 40.0 TO 44.9 IN ADULT: Primary | ICD-10-CM

## 2023-05-30 PROBLEM — E66.813 CLASS 3 SEVERE OBESITY DUE TO EXCESS CALORIES WITH SERIOUS COMORBIDITY AND BODY MASS INDEX (BMI) OF 40.0 TO 44.9 IN ADULT: Chronic | Status: ACTIVE | Noted: 2019-05-23

## 2023-05-30 RX ORDER — SEMAGLUTIDE 1.7 MG/.75ML
1.7 INJECTION, SOLUTION SUBCUTANEOUS WEEKLY
Qty: 3 ML | Refills: 0 | Status: SHIPPED | OUTPATIENT
Start: 2023-05-30

## 2023-05-31 NOTE — ASSESSMENT & PLAN NOTE
Problem getting 1 mg dose of Wegovy and would like to go to 1.7 mg. No significant side effects with 0.5 mg dose. Will send prescription to pharmacy. Advised to watch for significant GI side effects.   
No lymphadedenopathy

## 2023-08-02 ENCOUNTER — OFFICE VISIT (OUTPATIENT)
Dept: INTERNAL MEDICINE | Age: 63
End: 2023-08-02
Payer: COMMERCIAL

## 2023-08-02 VITALS
HEIGHT: 66 IN | SYSTOLIC BLOOD PRESSURE: 122 MMHG | WEIGHT: 233 LBS | BODY MASS INDEX: 37.45 KG/M2 | TEMPERATURE: 96.6 F | HEART RATE: 74 BPM | DIASTOLIC BLOOD PRESSURE: 80 MMHG | OXYGEN SATURATION: 98 %

## 2023-08-02 DIAGNOSIS — E78.2 MIXED HYPERLIPIDEMIA: Chronic | ICD-10-CM

## 2023-08-02 DIAGNOSIS — R73.03 PREDIABETES: Primary | Chronic | ICD-10-CM

## 2023-08-02 DIAGNOSIS — B07.9 VIRAL WARTS, UNSPECIFIED TYPE: ICD-10-CM

## 2023-08-02 DIAGNOSIS — E66.01 CLASS 2 SEVERE OBESITY DUE TO EXCESS CALORIES WITH SERIOUS COMORBIDITY AND BODY MASS INDEX (BMI) OF 37.0 TO 37.9 IN ADULT: Chronic | ICD-10-CM

## 2023-08-02 DIAGNOSIS — E11.9 TYPE 2 DIABETES MELLITUS WITHOUT COMPLICATION, WITHOUT LONG-TERM CURRENT USE OF INSULIN: Chronic | ICD-10-CM

## 2023-08-02 DIAGNOSIS — I10 ESSENTIAL HYPERTENSION: Chronic | ICD-10-CM

## 2023-08-02 LAB
ALBUMIN SERPL-MCNC: 4.2 G/DL (ref 3.5–5.2)
ALBUMIN/GLOB SERPL: 1.7 G/DL
ALP SERPL-CCNC: 64 U/L (ref 39–117)
ALT SERPL-CCNC: 15 U/L (ref 1–41)
AST SERPL-CCNC: 13 U/L (ref 1–40)
BILIRUB SERPL-MCNC: 0.5 MG/DL (ref 0–1.2)
BUN SERPL-MCNC: 10 MG/DL (ref 8–23)
BUN/CREAT SERPL: 7.4 (ref 7–25)
CALCIUM SERPL-MCNC: 9.8 MG/DL (ref 8.6–10.5)
CHLORIDE SERPL-SCNC: 103 MMOL/L (ref 98–107)
CHOLEST SERPL-MCNC: 121 MG/DL (ref 0–200)
CHOLEST/HDLC SERPL: 2.09 {RATIO}
CO2 SERPL-SCNC: 28.7 MMOL/L (ref 22–29)
CREAT SERPL-MCNC: 1.35 MG/DL (ref 0.76–1.27)
EGFRCR SERPLBLD CKD-EPI 2021: 59.4 ML/MIN/1.73
GLOBULIN SER CALC-MCNC: 2.5 GM/DL
GLUCOSE SERPL-MCNC: 92 MG/DL (ref 65–99)
HBA1C MFR BLD: 5.7 % (ref 4.8–5.6)
HDLC SERPL-MCNC: 58 MG/DL (ref 40–60)
LDLC SERPL CALC-MCNC: 45 MG/DL (ref 0–100)
POTASSIUM SERPL-SCNC: 4.2 MMOL/L (ref 3.5–5.2)
PROT SERPL-MCNC: 6.7 G/DL (ref 6–8.5)
SODIUM SERPL-SCNC: 139 MMOL/L (ref 136–145)
TRIGL SERPL-MCNC: 95 MG/DL (ref 0–150)
VLDLC SERPL CALC-MCNC: 18 MG/DL (ref 5–40)

## 2023-08-08 DIAGNOSIS — E66.01 CLASS 3 SEVERE OBESITY DUE TO EXCESS CALORIES WITH SERIOUS COMORBIDITY AND BODY MASS INDEX (BMI) OF 40.0 TO 44.9 IN ADULT: ICD-10-CM

## 2023-08-08 NOTE — TELEPHONE ENCOUNTER
"----- Message from Bill Torres MD sent at 8/7/2023  6:33 PM EDT -----  Regarding: FW: Wegovy refill  Contact: [phone]  Okay for 90 days prescription if needed since he is on max dose.     ----- Message -----  From: Narda Tavarez MA  Sent: 8/7/2023   3:46 PM EDT  To: Bill Torres MD  Subject: FW: Wegovy refill                                  ----- Message -----  From: Junior Estrada Jr. \"Bill\"  Sent: 8/7/2023  11:52 AM EDT  To: alek  [facility]  Subject: Wegovy refill                                    [facility] is now asking if I want to start having them deliver 3 months of Wegovy 2.4.  My insurance payment for getting it at [facility] will decrease if I don't take advantage of this.  Should I go ahead and have [facility] start processing this?  I'm still experiencing no side effects.  I have 2 weeks left on my current supply so there won't be an issue with the timing.      "

## 2023-10-09 DIAGNOSIS — E66.01 CLASS 3 SEVERE OBESITY DUE TO EXCESS CALORIES WITH SERIOUS COMORBIDITY AND BODY MASS INDEX (BMI) OF 40.0 TO 44.9 IN ADULT: ICD-10-CM

## 2023-10-09 RX ORDER — SEMAGLUTIDE 2.4 MG/.75ML
INJECTION, SOLUTION SUBCUTANEOUS
Qty: 9 ML | Refills: 1 | Status: SHIPPED | OUTPATIENT
Start: 2023-10-09

## 2023-10-30 DIAGNOSIS — K21.9 GASTROESOPHAGEAL REFLUX DISEASE WITHOUT ESOPHAGITIS: ICD-10-CM

## 2023-10-30 RX ORDER — OMEPRAZOLE 20 MG/1
CAPSULE, DELAYED RELEASE ORAL
Qty: 90 CAPSULE | Refills: 1 | Status: SHIPPED | OUTPATIENT
Start: 2023-10-30

## 2023-11-09 DIAGNOSIS — E11.9 TYPE 2 DIABETES MELLITUS WITHOUT COMPLICATION, WITHOUT LONG-TERM CURRENT USE OF INSULIN: Primary | Chronic | ICD-10-CM

## 2023-11-14 ENCOUNTER — TELEPHONE (OUTPATIENT)
Dept: INTERNAL MEDICINE | Age: 63
End: 2023-11-14
Payer: COMMERCIAL

## 2023-11-14 NOTE — TELEPHONE ENCOUNTER
----- Message from Bill Torres MD sent at 11/9/2023 10:16 AM EST -----  Regarding: CALL PATIENT  Call patient.     Schedule him for urine microalbumin (to check for kidney disease related to DM 2) within 1-2 weeks. Let him know this has not been completed over this year and should be completed before the end of this year. Advise to complete within next 1-2 weeks please.     Let me know if he refuses.

## 2023-11-29 ENCOUNTER — LAB (OUTPATIENT)
Facility: HOSPITAL | Age: 63
End: 2023-11-29
Payer: COMMERCIAL

## 2023-11-29 PROCEDURE — 82570 ASSAY OF URINE CREATININE: CPT | Performed by: INTERNAL MEDICINE

## 2023-11-29 PROCEDURE — 82043 UR ALBUMIN QUANTITATIVE: CPT | Performed by: INTERNAL MEDICINE

## 2024-01-03 DIAGNOSIS — E78.5 HYPERLIPIDEMIA, UNSPECIFIED HYPERLIPIDEMIA TYPE: Chronic | ICD-10-CM

## 2024-01-03 RX ORDER — ROSUVASTATIN CALCIUM 10 MG/1
TABLET, COATED ORAL
Qty: 90 TABLET | Refills: 3 | Status: SHIPPED | OUTPATIENT
Start: 2024-01-03

## 2024-01-08 ENCOUNTER — OFFICE VISIT (OUTPATIENT)
Dept: INTERNAL MEDICINE | Age: 64
End: 2024-01-08
Payer: COMMERCIAL

## 2024-01-08 VITALS
SYSTOLIC BLOOD PRESSURE: 128 MMHG | TEMPERATURE: 96.9 F | DIASTOLIC BLOOD PRESSURE: 80 MMHG | HEART RATE: 80 BPM | HEIGHT: 66 IN | OXYGEN SATURATION: 100 % | WEIGHT: 226 LBS | BODY MASS INDEX: 36.32 KG/M2

## 2024-01-08 DIAGNOSIS — E66.01 CLASS 2 SEVERE OBESITY DUE TO EXCESS CALORIES WITH SERIOUS COMORBIDITY AND BODY MASS INDEX (BMI) OF 36.0 TO 36.9 IN ADULT: Chronic | ICD-10-CM

## 2024-01-08 DIAGNOSIS — E11.9 TYPE 2 DIABETES MELLITUS WITHOUT COMPLICATION, WITHOUT LONG-TERM CURRENT USE OF INSULIN: Primary | Chronic | ICD-10-CM

## 2024-01-08 DIAGNOSIS — I10 ESSENTIAL HYPERTENSION: ICD-10-CM

## 2024-01-08 DIAGNOSIS — E78.2 MIXED HYPERLIPIDEMIA: Chronic | ICD-10-CM

## 2024-01-08 DIAGNOSIS — I10 ESSENTIAL HYPERTENSION: Chronic | ICD-10-CM

## 2024-01-08 LAB
ALBUMIN SERPL-MCNC: 4.5 G/DL (ref 3.5–5.2)
ALBUMIN/GLOB SERPL: 1.9 G/DL
ALP SERPL-CCNC: 69 U/L (ref 39–117)
ALT SERPL-CCNC: 14 U/L (ref 1–41)
AST SERPL-CCNC: 16 U/L (ref 1–40)
BILIRUB SERPL-MCNC: 0.5 MG/DL (ref 0–1.2)
BUN SERPL-MCNC: 16 MG/DL (ref 8–23)
BUN/CREAT SERPL: 12.9 (ref 7–25)
CALCIUM SERPL-MCNC: 9.6 MG/DL (ref 8.6–10.5)
CHLORIDE SERPL-SCNC: 104 MMOL/L (ref 98–107)
CO2 SERPL-SCNC: 27 MMOL/L (ref 22–29)
CREAT SERPL-MCNC: 1.24 MG/DL (ref 0.76–1.27)
EGFRCR SERPLBLD CKD-EPI 2021: 65.3 ML/MIN/1.73
GLOBULIN SER CALC-MCNC: 2.4 GM/DL
GLUCOSE SERPL-MCNC: 94 MG/DL (ref 65–99)
HBA1C MFR BLD: 5.8 % (ref 4.8–5.6)
POTASSIUM SERPL-SCNC: 4.6 MMOL/L (ref 3.5–5.2)
PROT SERPL-MCNC: 6.9 G/DL (ref 6–8.5)
SODIUM SERPL-SCNC: 139 MMOL/L (ref 136–145)

## 2024-01-08 PROCEDURE — 90686 IIV4 VACC NO PRSV 0.5 ML IM: CPT | Performed by: INTERNAL MEDICINE

## 2024-01-08 PROCEDURE — 99214 OFFICE O/P EST MOD 30 MIN: CPT | Performed by: INTERNAL MEDICINE

## 2024-01-08 PROCEDURE — 90471 IMMUNIZATION ADMIN: CPT | Performed by: INTERNAL MEDICINE

## 2024-01-08 RX ORDER — HYDROCHLOROTHIAZIDE 12.5 MG/1
TABLET ORAL
Qty: 90 TABLET | Refills: 1 | Status: SHIPPED | OUTPATIENT
Start: 2024-01-08

## 2024-01-08 NOTE — PROGRESS NOTES
I N T E R N A L  M E D I C I N E    J U N O H  K I M,  M D      ENCOUNTER DATE:  01/08/2024    Junior Estrada . / 63 y.o. / male    CHIEF COMPLAINT / REASON FOR OFFICE VISIT     Diabetes, Hyperlipidemia, Obesity, and Hypertension      ASSESSMENT & PLAN     Problem List Items Addressed This Visit          High    Type 2 diabetes mellitus without complication, without long-term current use of insulin - Primary (Chronic)    Overview     Metformin  mg with dinner  Wegovy 2.4 mg weekly          Current Assessment & Plan     We discussed that he has DM 2 based on prior A1c increase while taking metformin.   He is doing well now on Wegovy for weight loss and his A1c was lower at 5.7.    Lab Results   Component Value Date    HGBA1C 5.70 (H) 08/02/2023    HGBA1C 6.10 (H) 03/31/2023    HGBA1C 5.90 (H) 11/21/2022    CREATININE 1.35 (H) 08/02/2023    LDL 45 08/02/2023    MALBCRERATIO  11/29/2023      Comment:      Unable to calculate             Relevant Medications    metFORMIN ER (GLUCOPHAGE-XR) 500 MG 24 hr tablet    Other Relevant Orders    Hemoglobin A1c    Comprehensive Metabolic Panel       Medium    Hyperlipidemia (Chronic)    Overview     Continue rosuvastatin 10 mg qd.          Relevant Medications    rosuvastatin (CRESTOR) 10 MG tablet    Other Relevant Orders    Comprehensive Metabolic Panel    Class 2 severe obesity due to excess calories with serious comorbidity and body mass index (BMI) of 36.0 to 36.9 in adult (Chronic)    Overview     Continue Wegovy 2.4 mg weekly          Current Assessment & Plan     Wt Readings from Last 3 Encounters:   01/08/24 103 kg (226 lb)   08/02/23 106 kg (233 lb)   05/17/23 112 kg (248 lb)   Body mass index is 36.48 kg/m².     Ongoing weight loss on Wegovy without significant side effects. Continue 2.4 mg weekly.          Relevant Medications    Wegovy 2.4 MG/0.75ML solution auto-injector    Essential hypertension (Chronic)    Overview     Continue HCTZ 12.5 mg qd       "   Relevant Medications    hydroCHLOROthiazide (HYDRODIURIL) 12.5 MG tablet     Orders Placed This Encounter   Procedures    Fluzone (or Fluarix & Flulaval for VFC) >6 Mos ([id number])    Hemoglobin A1c    Comprehensive Metabolic Panel     No orders of the defined types were placed in this encounter.      SUMMARY/DISCUSSION  Doing well. Continue all current medications.   Advised to have diabetic eye exam on next eye care visit.   All vaccine recommendations provided.       Next Appointment with me: Visit date not found    Return in about 5 months (around 6/8/2024) for Diabetes, Hypertension, Hyperlipidemia.        VITAL SIGNS     Vitals:    01/08/24 0815   BP: 128/80   Pulse: 80   Temp: 96.9 °F (36.1 °C)   SpO2: 100%   Weight: 103 kg (226 lb)   Height: 167.6 cm (66\")       BP Readings from Last 3 Encounters:   01/08/24 128/80   08/02/23 122/80   03/31/23 120/80     Wt Readings from Last 3 Encounters:   01/08/24 103 kg (226 lb)   08/02/23 106 kg (233 lb)   05/17/23 112 kg (248 lb)     Body mass index is 36.48 kg/m².    Blood pressure readings recorded on patient flowsheet:  [employer] Blood Pressure Flowsheet Systolic Diastolic Pulse   7/7/2022  10:08  80 81   6/17/2022  10:21  77 74   6/10/2022  10:03  81 80   6/9/2022   9:56  76 74   6/8/2022   9:35  77 85   6/6/2022   9:35  83 73   6/3/2022   9:50  86 84   6/2/2022   9:59  73 74   6/1/2022   9:28  80 80   5/31/2022   9:13  84 85       MEDICATIONS AT THE TIME OF OFFICE VISIT     Current Outpatient Medications on File Prior to Visit   Medication Sig    diphenhydrAMINE (BENADRYL) 25 mg capsule Take 1 capsule by mouth Every Night.    hydroCHLOROthiazide (HYDRODIURIL) 12.5 MG tablet TAKE 1 TABLET DAILY    loratadine (CLARITIN) 10 MG tablet Take 1 tablet by mouth Daily.    melatonin 5 MG tablet tablet Take 1 tablet by mouth At Night As Needed.    metFORMIN ER (GLUCOPHAGE-XR) 500 MG 24 hr tablet TAKE 1 TABLET WITH " DINNER    omeprazole (priLOSEC) 20 MG capsule TAKE 1 CAPSULE DAILY    rosuvastatin (CRESTOR) 10 MG tablet TAKE 1 TABLET DAILY    tamsulosin (FLOMAX) 0.4 MG capsule 24 hr capsule TAKE 1 CAPSULE EVERY NIGHT    Wegovy 2.4 MG/0.75ML solution auto-injector INJECT 2.4 MG UNDER THE SKIN INTO THE APPROPRIATE AREA AS DIRECTED ONCE A WEEK     No current facility-administered medications on file prior to visit.         HISTORY OF PRESENT ILLNESS     Hypertension and hyperlipidemia are stable on medications. Blood pressure today is 118/68 mmHg. Lipid panel in 08/2023 was within normal limits.     His last A1c in 08/2023 was 5.7 percent, down from 6.1 percent in 03/2023. Denies monitoring his glucose at home. He has lost 22 pounds since 05/2023 when he started Wegovy and his BMI is 36 kg/m².  Denies any issues with Wegovy.     Lab Results   Component Value Date    HGBA1C 5.70 (H) 08/02/2023    HGBA1C 6.10 (H) 03/31/2023    HGBA1C 5.90 (H) 11/21/2022    CREATININE 1.35 (H) 08/02/2023    LDL 45 08/02/2023    MALBCRERATIO  11/29/2023      Comment:      Unable to calculate     Blood sugar readings recorded on patient's flowsheet:       No data to display               103 kg (226 lb)    REVIEW OF SYSTEMS     Weight loss  GI negative  CV negative       PHYSICAL EXAMINATION     Physical Exam  Weight loss noted   Cardiovascular: Normal rate, regular rhythm.       REVIEWED DATA     Labs:       Lab Results   Component Value Date     08/02/2023    K 4.2 08/02/2023    CALCIUM 9.8 08/02/2023    AST 13 08/02/2023    ALT 15 08/02/2023    BUN 10 08/02/2023    CREATININE 1.35 (H) 08/02/2023    CREATININE 1.19 11/21/2022    CREATININE 1.25 05/18/2022    EGFRIFNONA 59 (L) 12/08/2021    EGFRIFAFRI 69 12/08/2021       Lab Results   Component Value Date    HGBA1C 5.70 (H) 08/02/2023    HGBA1C 6.10 (H) 03/31/2023    HGBA1C 5.90 (H) 11/21/2022       Lab Results   Component Value Date    LDL 45 08/02/2023    LDL 48 11/21/2022    LDL 70 12/08/2021     HDL 58 08/02/2023    HDL 66 (H) 11/21/2022    TRIG 95 08/02/2023    TRIG 148 11/21/2022       Lab Results   Component Value Date    TSH 3.300 12/08/2021    TSH 2.010 05/23/2019    FREET4 0.94 12/08/2021    FREET4 1.02 05/23/2019       Lab Results   Component Value Date    WBC 5.35 03/31/2023    HGB 12.3 (L) 03/31/2023     03/31/2023       Lab Results   Component Value Date    MALBCRERATIO  11/29/2023      Comment:      Unable to calculate        Imaging:           Medical Tests:           Summary of old records / correspondence / consultant report:           Request outside records:             Transcribed from ambient dictation for Bill Torres MD by Nely Liriano.  01/08/24   09:27 EST    Patient or patient representative verbalized consent to the visit recording.  I have personally performed the services described in this document as transcribed by the above individual, and it is both accurate and complete.  Bill Torres MD  1/8/2024  09:51 EST

## 2024-01-08 NOTE — LETTER
[facility]  Vaccine Consent Form    Patient Name:  Junior Estrada Jr.  Patient :  1960     Vaccine(s) Ordered    Fluzone (or Fluarix & Flulaval for VFC) >6 Mos ([id number])        Screening Checklist  The following questions should be completed prior to vaccination. If you answer “yes” to any question, it does not necessarily mean you should not be vaccinated. It just means we may need to clarify or ask more questions. If a question is unclear, please ask your healthcare provider to explain it.    Yes No   Any fever or moderate to severe illness today (mild illness and/or antibiotic treatment are not contraindications)?     Do you have a history of a serious reaction to any previous vaccinations, such as anaphylaxis, encephalopathy within 7 days, Guillain-[place] syndrome within 6 weeks, seizure?     Have you received any live vaccine(s) (e.g MMR, MARY) or any other vaccines in the last month (to ensure duplicate doses aren't given)?     Do you have an anaphylactic allergy to latex (DTaP, DTaP-IPV, Hep A, Hep B, MenB, RV, Td, Tdap), baker’s yeast (Hep B, HPV), polysorbates (RSV, nirsevimab, PCV 20, Rotavirrus, Tdap, Shingrix), or gelatin (MARY, MMR)?     Do you have an anaphylactic allergy to neomycin (Rabies, MARY, MMR, IPV, Hep A), polymyxin B (IPV), or streptomycin (IPV)?      Any cancer, leukemia, AIDS, or other immune system disorder? (MARY, MMR, RV)     Do you have a parent, brother, or sister with an immune system problem (if immune competence of vaccine recipient clinically verified, can proceed)? (MMR, MARY)     Any recent steroid treatments for >2 weeks, chemotherapy, or radiation treatment? (MARY, MMR)     Have you received antibody-containing blood transfusions or IVIG in the past 11 months (recommended interval is dependent on product)? (MMR, MARY)     Have you taken antiviral drugs (acyclovir, famciclovir, valacyclovir for MARY) in the last 24 or 48 hours, respectively?      Are you pregnant or  "planning to become pregnant within 1 month? (MARY, MMR, HPV, IPV, MenB, Abrexvy; For Hep B- refer to Engerix-B; For RSV - Abrysvo is indicated for 32-36 weeks of pregnancy from September to January)     For infants, have you ever been told your child has had intussusception or a medical emergency involving obstruction of the intestine (Rotavirus)? If not for an infant, can skip this question.         *Ordering Physicians/APC should be consulted if \"yes\" is checked by the patient or guardian above.  I have received, read, and understand the Vaccine Information Statement (VIS) for each vaccine ordered.  I have considered my or my child's health status as well as the health status of my close contacts.  I have taken the opportunity to discuss my vaccine questions with my or my child's health care provider.   I have requested that the ordered vaccine(s) be given to me or my child.  I understand the benefits and risks of the vaccines.  I understand that I should remain in the clinic for 15 minutes after receiving the vaccine(s).  _________________________________________________________  Signature of Patient or Parent/Legal Guardian ____________________  Date     "

## 2024-01-08 NOTE — ASSESSMENT & PLAN NOTE
Wt Readings from Last 3 Encounters:   01/08/24 103 kg (226 lb)   08/02/23 106 kg (233 lb)   05/17/23 112 kg (248 lb)     Body mass index is 36.48 kg/m².     Ongoing weight loss on Wegovy without significant side effects. Continue 2.4 mg weekly.

## 2024-01-08 NOTE — ASSESSMENT & PLAN NOTE
We discussed that he has DM 2 based on prior A1c increase while taking metformin.   He is doing well now on Wegovy for weight loss and his A1c was lower at 5.7.    Lab Results   Component Value Date    HGBA1C 5.70 (H) 08/02/2023    HGBA1C 6.10 (H) 03/31/2023    HGBA1C 5.90 (H) 11/21/2022    CREATININE 1.35 (H) 08/02/2023    LDL 45 08/02/2023    MALBCRERATIO  11/29/2023      Comment:      Unable to calculate

## 2024-01-16 DIAGNOSIS — N40.1 BENIGN PROSTATIC HYPERPLASIA WITH LOWER URINARY TRACT SYMPTOMS, SYMPTOM DETAILS UNSPECIFIED: ICD-10-CM

## 2024-01-16 RX ORDER — TAMSULOSIN HYDROCHLORIDE 0.4 MG/1
CAPSULE ORAL
Qty: 90 CAPSULE | Refills: 1 | Status: SHIPPED | OUTPATIENT
Start: 2024-01-16

## 2024-02-12 NOTE — ASSESSMENT & PLAN NOTE
Blood pressure little higher, increased edema of legs.   Maintain low sodium diet of less than 2.5 gm.    Continue HCTZ 12.5 mg qd.   Monitor home blood pressure closer.     BP Readings from Last 3 Encounters:   01/13/21 132/86   07/10/20 126/86   02/25/20 122/88      
Wt gain noted. Check A1c. Maintain a low sugar/starch/carbohydrate diet and exercise regularly. Wt loss advised.    
Off/Belly Press -, 5/5   Neurovascular Intact   Exquisite tenderness over the proximal humerus and the left with bruising       IMAGING: XR of the left humerus with 2 views obtained in office dated 02/12/2024 reviewed and read by Dr. Hastings: Proximal humerus fracture no change in position of the greater tuberosity fragment.  The fracture line that extends well into the shaft    XR of the left humerus with 2 views obtained in office dated 02/06/2024 reviewed and read by Dr. Hastings: Nondisplaced proximal humerus fracture     IMPRESSION:      ICD-10-CM    1. Closed fracture of proximal end of left humerus with routine healing, unspecified fracture morphology, subsequent encounter  S42.202D AMB POC XRAY; HUMERUS, 2+ VIEWS      2. Pain of left humerus  M89.8X2 AMB POC XRAY; HUMERUS, 2+ VIEWS           PLAN:   1. Pt presented today with left shoulder pain secondary to a fracture of the proximal end of her humerus that has began to heal. I indicated that the patient may take her sling off when sitting down. I also indicated that that she may move her elbow and wrist while sitting and while out of her sling. I advised the patient to perform passive ROM exercises if needed. Will plan on start weaning her off of the sling in another 3 weeks.    Risk factors include: GERD, HTN, RA, Allergic to NSAIDs   2. No cortisone injection indicated today   3. No Physical/Occupational Therapy indicated today  4. No diagnostic test indicated today:   5. No durable medical equipment indicated today  6. No referral indicated today   7. No medications indicated today:   8. No Narcotic indicated today.       RTC 2 weeks for re-Xray       Scribed by Skip Song (Darryl) as dictated by Dereck Weiss MD    I, Dr. Dereck Weiss, confirm that all documentation is accurate.    Dereck Weiss M.D.   [facility]

## 2024-02-21 ENCOUNTER — OFFICE VISIT (OUTPATIENT)
Dept: SLEEP MEDICINE | Facility: HOSPITAL | Age: 64
End: 2024-02-21
Payer: COMMERCIAL

## 2024-02-21 VITALS — HEIGHT: 66 IN | WEIGHT: 230.4 LBS | BODY MASS INDEX: 37.03 KG/M2 | HEART RATE: 80 BPM | OXYGEN SATURATION: 98 %

## 2024-02-21 DIAGNOSIS — E66.01 CLASS 2 SEVERE OBESITY DUE TO EXCESS CALORIES WITH SERIOUS COMORBIDITY AND BODY MASS INDEX (BMI) OF 37.0 TO 37.9 IN ADULT: ICD-10-CM

## 2024-02-21 DIAGNOSIS — G47.36 HYPOXEMIA ASSOCIATED WITH SLEEP: ICD-10-CM

## 2024-02-21 DIAGNOSIS — G47.33 OSA (OBSTRUCTIVE SLEEP APNEA): Primary | Chronic | ICD-10-CM

## 2024-02-21 PROCEDURE — G0463 HOSPITAL OUTPT CLINIC VISIT: HCPCS

## 2024-04-07 DIAGNOSIS — E66.01 CLASS 3 SEVERE OBESITY DUE TO EXCESS CALORIES WITH SERIOUS COMORBIDITY AND BODY MASS INDEX (BMI) OF 40.0 TO 44.9 IN ADULT: ICD-10-CM

## 2024-04-08 RX ORDER — SEMAGLUTIDE 2.4 MG/.75ML
INJECTION, SOLUTION SUBCUTANEOUS
Qty: 9 ML | Refills: 1 | Status: SHIPPED | OUTPATIENT
Start: 2024-04-08

## 2024-04-15 DIAGNOSIS — R73.03 PREDIABETES: Chronic | ICD-10-CM

## 2024-04-15 RX ORDER — METFORMIN HYDROCHLORIDE 500 MG/1
TABLET, EXTENDED RELEASE ORAL
Qty: 90 TABLET | Refills: 1 | Status: SHIPPED | OUTPATIENT
Start: 2024-04-15

## 2024-04-29 DIAGNOSIS — K21.9 GASTROESOPHAGEAL REFLUX DISEASE WITHOUT ESOPHAGITIS: ICD-10-CM

## 2024-04-29 RX ORDER — OMEPRAZOLE 20 MG/1
CAPSULE, DELAYED RELEASE ORAL
Qty: 90 CAPSULE | Refills: 1 | Status: SHIPPED | OUTPATIENT
Start: 2024-04-29

## 2024-06-18 ENCOUNTER — OFFICE VISIT (OUTPATIENT)
Dept: INTERNAL MEDICINE | Age: 64
End: 2024-06-18
Payer: COMMERCIAL

## 2024-06-18 VITALS
BODY MASS INDEX: 36.96 KG/M2 | HEART RATE: 76 BPM | DIASTOLIC BLOOD PRESSURE: 90 MMHG | WEIGHT: 230 LBS | OXYGEN SATURATION: 97 % | TEMPERATURE: 96.8 F | SYSTOLIC BLOOD PRESSURE: 132 MMHG | HEIGHT: 66 IN

## 2024-06-18 DIAGNOSIS — E66.01 CLASS 2 SEVERE OBESITY DUE TO EXCESS CALORIES WITH SERIOUS COMORBIDITY AND BODY MASS INDEX (BMI) OF 37.0 TO 37.9 IN ADULT: Chronic | ICD-10-CM

## 2024-06-18 DIAGNOSIS — I10 ESSENTIAL HYPERTENSION: Chronic | ICD-10-CM

## 2024-06-18 DIAGNOSIS — E78.2 MIXED HYPERLIPIDEMIA: Chronic | ICD-10-CM

## 2024-06-18 DIAGNOSIS — E11.9 TYPE 2 DIABETES MELLITUS WITHOUT COMPLICATION, WITHOUT LONG-TERM CURRENT USE OF INSULIN: Primary | Chronic | ICD-10-CM

## 2024-06-18 PROCEDURE — 99214 OFFICE O/P EST MOD 30 MIN: CPT | Performed by: INTERNAL MEDICINE

## 2024-06-18 RX ORDER — LANCETS
EACH MISCELLANEOUS
Qty: 100 EACH | Refills: 3 | Status: SHIPPED | OUTPATIENT
Start: 2024-06-18

## 2024-06-18 RX ORDER — BLOOD SUGAR DIAGNOSTIC
STRIP MISCELLANEOUS
Qty: 100 EACH | Refills: 3 | Status: SHIPPED | OUTPATIENT
Start: 2024-06-18

## 2024-06-18 RX ORDER — BLOOD-GLUCOSE METER
EACH MISCELLANEOUS
Qty: 1 EACH | Refills: 0 | Status: SHIPPED | OUTPATIENT
Start: 2024-06-18

## 2024-06-18 NOTE — ASSESSMENT & PLAN NOTE
Lab Results   Component Value Date    HGBA1C 5.80 (H) 01/08/2024    HGBA1C 5.70 (H) 08/02/2023    HGBA1C 6.10 (H) 03/31/2023    CREATININE 1.24 01/08/2024    LDL 45 08/02/2023    MALBCRERATIO  11/29/2023      Comment:      Unable to calculate      Check A1c. Continue Wegovy 2.4 and metformin ER.

## 2024-06-18 NOTE — PROGRESS NOTES
I N T E R N A L  M E D I C I N E    J U N O H  K I M,  M D      ENCOUNTER DATE:  06/18/2024    Junior Estrada . / 63 y.o. / male    CHIEF COMPLAINT / REASON FOR OFFICE VISIT     Diabetes, Hyperlipidemia, Obesity, and Hypertension      ASSESSMENT & PLAN     Problem List Items Addressed This Visit          High    Type 2 diabetes mellitus without complication, without long-term current use of insulin - Primary (Chronic)    Overview     Metformin  mg with dinner  Wegovy 2.4 mg weekly          Current Assessment & Plan     Lab Results   Component Value Date    HGBA1C 5.80 (H) 01/08/2024    HGBA1C 5.70 (H) 08/02/2023    HGBA1C 6.10 (H) 03/31/2023    CREATININE 1.24 01/08/2024    LDL 45 08/02/2023    MALBCRERATIO  11/29/2023      Comment:      Unable to calculate      Check A1c. Continue Wegovy 2.4 and metformin ER.          Relevant Medications    metFORMIN ER (GLUCOPHAGE-XR) 500 MG 24 hr tablet    Blood Glucose Monitoring Suppl (ONE TOUCH ULTRA 2) w/Device kit    glucose blood (OneTouch Ultra) test strip    Lancets (onetouch ultrasoft) lancets    Other Relevant Orders    Hemoglobin A1c       Medium    Hyperlipidemia (Chronic)    Overview     Continue rosuvastatin 10 mg qd.          Relevant Medications    rosuvastatin (CRESTOR) 10 MG tablet    Class 2 severe obesity due to excess calories with serious comorbidity and body mass index (BMI) of 37.0 to 37.9 in adult (Chronic)    Overview     Continue Wegovy 2.4 mg weekly          Current Assessment & Plan     Wt Readings from Last 3 Encounters:   06/18/24 104 kg (230 lb)   02/21/24 105 kg (230 lb 6.4 oz)   01/08/24 103 kg (226 lb)     Body mass index is 37.12 kg/m².     Continue Wegovy 2.4 mg weekly.          Relevant Medications    Wegovy 2.4 MG/0.75ML solution auto-injector    Essential hypertension (Chronic)    Overview     Continue HCTZ 12.5 mg qd         Relevant Medications    hydroCHLOROthiazide (HYDRODIURIL) 12.5 MG tablet     Orders Placed This  "Encounter   Procedures    Hemoglobin A1c     New Medications Ordered This Visit   Medications    Blood Glucose Monitoring Suppl (ONE TOUCH ULTRA 2) w/Device kit     Sig: Check sugar daily.     Dispense:  1 each     Refill:  0     May substitute if needed for formulary purposes.    glucose blood (OneTouch Ultra) test strip     Sig: Use as instructed     Dispense:  100 each     Refill:  3     MAY SUBSTITUTE AS NEEDED FOR PATIENT PREFERENCE AND INSURANCE FORMULARY PURPOSES    Lancets (onetouch ultrasoft) lancets     Sig: Use as instructed     Dispense:  100 each     Refill:  3     May substitute as needed for insurance formulary reasons.       SUMMARY/DISCUSSION      Next Appointment with me: Visit date not found    Return in about 4 months (around 10/18/2024) for Reassess chronic medical problems.      VITAL SIGNS     Vitals:    06/18/24 1454   BP: 132/90   Pulse: 76   Temp: 96.8 °F (36 °C)   SpO2: 97%   Weight: 104 kg (230 lb)   Height: 167.6 cm (66\")       BP Readings from Last 3 Encounters:   06/18/24 132/90   01/08/24 128/80   08/02/23 122/80     Wt Readings from Last 3 Encounters:   06/18/24 104 kg (230 lb)   02/21/24 105 kg (230 lb 6.4 oz)   01/08/24 103 kg (226 lb)     Body mass index is 37.12 kg/m².    Blood pressure readings recorded on patient flowsheet:  [facility] Blood Pressure Flowsheet Systolic Diastolic Pulse   7/7/2022  10:08  80 81   6/17/2022  10:21  77 74   6/10/2022  10:03  81 80   6/9/2022   9:56  76 74   6/8/2022   9:35  77 85   6/6/2022   9:35  83 73   6/3/2022   9:50  86 84   6/2/2022   9:59  73 74   6/1/2022   9:28  80 80   5/31/2022   9:13  84 85         MEDICATIONS AT THE TIME OF OFFICE VISIT     Current Outpatient Medications on File Prior to Visit   Medication Sig    diphenhydrAMINE (BENADRYL) 25 mg capsule Take 1 capsule by mouth Every Night.    hydroCHLOROthiazide (HYDRODIURIL) 12.5 MG tablet TAKE 1 TABLET DAILY    loratadine " (CLARITIN) 10 MG tablet Take 1 tablet by mouth Daily.    melatonin 5 MG tablet tablet Take 1 tablet by mouth At Night As Needed.    metFORMIN ER (GLUCOPHAGE-XR) 500 MG 24 hr tablet TAKE 1 TABLET WITH DINNER    omeprazole (priLOSEC) 20 MG capsule TAKE 1 CAPSULE DAILY    rosuvastatin (CRESTOR) 10 MG tablet TAKE 1 TABLET DAILY    tamsulosin (FLOMAX) 0.4 MG capsule 24 hr capsule TAKE 1 CAPSULE EVERY NIGHT    Wegovy 2.4 MG/0.75ML solution auto-injector INJECT 2.4 MG UNDER THE SKIN INTO THE APPROPRIATE AREA AS DIRECTED ONCE A WEEK     No current facility-administered medications on file prior to visit.          HISTORY OF PRESENT ILLNESS     Denies any significant changes or problems.  He is on Wegovy 2.4 mg weekly but complains of increasing cost for 3-month supply.  Denies significant GI side effects.  He is also on metformin  mg daily.  He is on rosuvastatin 10 mg for hyperlipidemia.  He is on hydrochlorothiazide 12.5 mg for hypertension which remains stable.    Patient Care Team:  Bill Torres MD as PCP - General (Internal Medicine)    REVIEW OF SYSTEMS     Review of Systems   Constitutional neg except per HPI   Resp neg  CV neg   GI negative     PHYSICAL EXAMINATION     Physical Exam  Alert with normal thought and judgment.   Cardiovascular: Normal rate, regular rhythm.       REVIEWED DATA     Labs:     Lab Results   Component Value Date     01/08/2024    K 4.6 01/08/2024    CALCIUM 9.6 01/08/2024    AST 16 01/08/2024    ALT 14 01/08/2024    BUN 16 01/08/2024    CREATININE 1.24 01/08/2024    CREATININE 1.35 (H) 08/02/2023    CREATININE 1.19 11/21/2022    EGFRRESULT 65.3 01/08/2024       Lab Results   Component Value Date    HGBA1C 5.80 (H) 01/08/2024    HGBA1C 5.70 (H) 08/02/2023    HGBA1C 6.10 (H) 03/31/2023       Lab Results   Component Value Date    LDL 45 08/02/2023    LDL 48 11/21/2022    LDL 70 12/08/2021    HDL 58 08/02/2023    HDL 66 (H) 11/21/2022    TRIG 95 08/02/2023    TRIG 148 11/21/2022        Lab Results   Component Value Date    TSH 3.300 12/08/2021    TSH 2.010 05/23/2019    FREET4 0.94 12/08/2021    FREET4 1.02 05/23/2019       Lab Results   Component Value Date    WBC 5.35 03/31/2023    HGB 12.3 (L) 03/31/2023     03/31/2023       Lab Results   Component Value Date    MALBCRERATIO  11/29/2023      Comment:      Unable to calculate             Imaging:               Medical Tests:               Summary of old records / correspondence / consultant report:             Request outside records:

## 2024-06-18 NOTE — ASSESSMENT & PLAN NOTE
Wt Readings from Last 3 Encounters:   06/18/24 104 kg (230 lb)   02/21/24 105 kg (230 lb 6.4 oz)   01/08/24 103 kg (226 lb)     Body mass index is 37.12 kg/m².     Continue Wegovy 2.4 mg weekly.

## 2024-06-19 LAB — HBA1C MFR BLD: 5.6 % (ref 4.8–5.6)

## 2024-07-12 DIAGNOSIS — I10 ESSENTIAL HYPERTENSION: ICD-10-CM

## 2024-07-12 DIAGNOSIS — N40.1 BENIGN PROSTATIC HYPERPLASIA WITH LOWER URINARY TRACT SYMPTOMS, SYMPTOM DETAILS UNSPECIFIED: ICD-10-CM

## 2024-07-12 RX ORDER — TAMSULOSIN HYDROCHLORIDE 0.4 MG/1
CAPSULE ORAL
Qty: 90 CAPSULE | Refills: 1 | Status: SHIPPED | OUTPATIENT
Start: 2024-07-12

## 2024-07-12 RX ORDER — HYDROCHLOROTHIAZIDE 12.5 MG/1
TABLET ORAL
Qty: 90 TABLET | Refills: 1 | Status: SHIPPED | OUTPATIENT
Start: 2024-07-12

## 2024-08-12 DIAGNOSIS — E11.9 TYPE 2 DIABETES MELLITUS WITHOUT COMPLICATION, WITHOUT LONG-TERM CURRENT USE OF INSULIN: Chronic | ICD-10-CM

## 2024-08-12 RX ORDER — BLOOD SUGAR DIAGNOSTIC
STRIP MISCELLANEOUS
Qty: 100 EACH | Refills: 3 | Status: SHIPPED | OUTPATIENT
Start: 2024-08-12

## 2024-09-22 DIAGNOSIS — E66.01 CLASS 3 SEVERE OBESITY DUE TO EXCESS CALORIES WITH SERIOUS COMORBIDITY AND BODY MASS INDEX (BMI) OF 40.0 TO 44.9 IN ADULT: ICD-10-CM

## 2024-09-23 RX ORDER — SEMAGLUTIDE 2.4 MG/.75ML
INJECTION, SOLUTION SUBCUTANEOUS
Qty: 9 ML | Refills: 0 | Status: SHIPPED | OUTPATIENT
Start: 2024-09-23

## 2024-10-10 DIAGNOSIS — R73.03 PREDIABETES: Chronic | ICD-10-CM

## 2024-10-10 RX ORDER — METFORMIN HCL 500 MG
TABLET, EXTENDED RELEASE 24 HR ORAL
Qty: 90 TABLET | Refills: 1 | Status: SHIPPED | OUTPATIENT
Start: 2024-10-10

## 2024-10-23 ENCOUNTER — OFFICE VISIT (OUTPATIENT)
Dept: INTERNAL MEDICINE | Age: 64
End: 2024-10-23
Payer: COMMERCIAL

## 2024-10-23 VITALS
SYSTOLIC BLOOD PRESSURE: 120 MMHG | WEIGHT: 234 LBS | OXYGEN SATURATION: 97 % | DIASTOLIC BLOOD PRESSURE: 82 MMHG | HEIGHT: 66 IN | HEART RATE: 73 BPM | BODY MASS INDEX: 37.61 KG/M2 | TEMPERATURE: 96.9 F

## 2024-10-23 DIAGNOSIS — I10 ESSENTIAL HYPERTENSION: Chronic | ICD-10-CM

## 2024-10-23 DIAGNOSIS — E78.2 MIXED HYPERLIPIDEMIA: Chronic | ICD-10-CM

## 2024-10-23 DIAGNOSIS — E11.9 TYPE 2 DIABETES MELLITUS WITHOUT COMPLICATION, WITHOUT LONG-TERM CURRENT USE OF INSULIN: Primary | Chronic | ICD-10-CM

## 2024-10-23 DIAGNOSIS — Z12.5 ENCOUNTER FOR SCREENING FOR MALIGNANT NEOPLASM OF PROSTATE: ICD-10-CM

## 2024-10-23 PROCEDURE — 99214 OFFICE O/P EST MOD 30 MIN: CPT | Performed by: INTERNAL MEDICINE

## 2024-10-23 PROCEDURE — 90656 IIV3 VACC NO PRSV 0.5 ML IM: CPT | Performed by: INTERNAL MEDICINE

## 2024-10-23 PROCEDURE — 90471 IMMUNIZATION ADMIN: CPT | Performed by: INTERNAL MEDICINE

## 2024-10-23 RX ORDER — LISINOPRIL 10 MG/1
10 TABLET ORAL DAILY
Qty: 90 TABLET | Refills: 1 | Status: SHIPPED | OUTPATIENT
Start: 2024-10-23

## 2024-10-23 NOTE — LETTER
[facility]  Vaccine Consent Form    Patient Name:  Junior Estrada Jr.  Patient :  1960     Vaccine(s) Ordered    Fluzone >6mos        Screening Checklist  The following questions should be completed prior to vaccination. If you answer “yes” to any question, it does not necessarily mean you should not be vaccinated. It just means we may need to clarify or ask more questions. If a question is unclear, please ask your healthcare provider to explain it.    Yes No   Any fever or moderate to severe illness today (mild illness and/or antibiotic treatment are not contraindications)?     Do you have a history of a serious reaction to any previous vaccinations, such as anaphylaxis, encephalopathy within 7 days, Guillain-[place] syndrome within 6 weeks, seizure?     Have you received any live vaccine(s) (e.g MMR, MARY) or any other vaccines in the last month (to ensure duplicate doses aren't given)?     Do you have an anaphylactic allergy to latex (DTaP, DTaP-IPV, Hep A, Hep B, MenB, RV, Td, Tdap), baker’s yeast (Hep B, HPV), polysorbates (RSV, nirsevimab, PCV 20, Rotavirrus, Tdap, Shingrix), or gelatin (MARY, MMR)?     Do you have an anaphylactic allergy to neomycin (Rabies, MARY, MMR, IPV, Hep A), polymyxin B (IPV), or streptomycin (IPV)?      Any cancer, leukemia, AIDS, or other immune system disorder? (MARY, MMR, RV)     Do you have a parent, brother, or sister with an immune system problem (if immune competence of vaccine recipient clinically verified, can proceed)? (MMR, MARY)     Any recent steroid treatments for >2 weeks, chemotherapy, or radiation treatment? (MARY, MMR)     Have you received antibody-containing blood transfusions or IVIG in the past 11 months (recommended interval is dependent on product)? (MMR, MARY)     Have you taken antiviral drugs (acyclovir, famciclovir, valacyclovir for MARY) in the last 24 or 48 hours, respectively?      Are you pregnant or planning to become pregnant within 1 month? (MARY,  "MMR, HPV, IPV, MenB, Abrexvy; For Hep B- refer to Engerix-B; For RSV - Abrysvo is indicated for 32-36 weeks of pregnancy from September to January)     For infants, have you ever been told your child has had intussusception or a medical emergency involving obstruction of the intestine (Rotavirus)? If not for an infant, can skip this question.         *Ordering Physicians/APC should be consulted if \"yes\" is checked by the patient or guardian above.  I have received, read, and understand the Vaccine Information Statement (VIS) for each vaccine ordered.  I have considered my or my child's health status as well as the health status of my close contacts.  I have taken the opportunity to discuss my vaccine questions with my or my child's health care provider.   I have requested that the ordered vaccine(s) be given to me or my child.  I understand the benefits and risks of the vaccines.  I understand that I should remain in the clinic for 15 minutes after receiving the vaccine(s).  _________________________________________________________  Signature of Patient or Parent/Legal Guardian ____________________  Date     "

## 2024-10-23 NOTE — PROGRESS NOTES
928.                            J  U  N  O  H    K  I  M ,   M  D                  I  N  T  E  R  N  A  L    M  E  D  I  C  I  N  E         ENCOUNTER DATE:  10/23/2024    Junior Estrada . / 64 y.o. / male    OFFICE VISIT ENCOUNTER       CHIEF COMPLAINT / REASON FOR OFFICE VISIT     Diabetes, Hyperlipidemia, Obesity, and Hypertension      ASSESSMENT & PLAN     Problem List Items Addressed This Visit          High    Essential hypertension (Chronic)    Current Assessment & Plan     Discontinue HCTZ 12.5 mg and start lisinopril 10 mg daily.   Monitor home blood pressure readings.      BP Readings from Last 3 Encounters:   10/23/24 120/82   06/18/24 132/90   01/08/24 128/80             Relevant Medications    lisinopril (PRINIVIL,ZESTRIL) 10 MG tablet    Other Relevant Orders    TSH+Free T4    Type 2 diabetes mellitus without complication, without long-term current use of insulin - Primary (Chronic)    Overview     Metformin  mg with dinner  Wegovy 2.4 mg weekly          Current Assessment & Plan     Discontinue HCTZ and start lisinopril 10 mg daily     Continue metformin ER and Wegovy 2.4 mg.   Request last diabetic eye exam.          Relevant Medications    Blood Glucose Monitoring Suppl (ONE TOUCH ULTRA 2) w/Device kit    Lancets (onetouch ultrasoft) lancets    glucose blood (OneTouch Ultra) test strip    metFORMIN ER (GLUCOPHAGE-XR) 500 MG 24 hr tablet    lisinopril (PRINIVIL,ZESTRIL) 10 MG tablet    Other Relevant Orders    Comprehensive Metabolic Panel    Hemoglobin A1c    Microalbumin / Creatinine Urine Ratio - Urine, Clean Catch       Medium    Hyperlipidemia (Chronic)    Overview     Continue rosuvastatin 10 mg qd.          Current Assessment & Plan     Lab Results   Component Value Date    LDL 45 08/02/2023    LDL 48 11/21/2022    LDL 70 12/08/2021    TRIG 95 08/02/2023    CHOLHDLRATIO 2.09 08/02/2023       Check labs today.  Continue rosuvastatin 10 mg daily         Relevant Medications     "rosuvastatin (CRESTOR) 10 MG tablet    Other Relevant Orders    Comprehensive Metabolic Panel    Lipid Panel With / Chol / HDL Ratio     Other Visit Diagnoses       Encounter for screening for malignant neoplasm of prostate        Relevant Orders    PSA Screen          Orders Placed This Encounter   Procedures    Fluzone >6mos    Comprehensive Metabolic Panel     Order Specific Question:   Release to patient     Answer:   Routine Release [[id number]]    Lipid Panel With / Chol / HDL Ratio     Order Specific Question:   Release to patient     Answer:   Routine Release [[id number]]    Hemoglobin A1c     Order Specific Question:   Release to patient     Answer:   Routine Release [[id number]]    TSH+Free T4     Order Specific Question:   Release to patient     Answer:   Routine Release [[id number]]    Microalbumin / Creatinine Urine Ratio - Urine, Clean Catch     Order Specific Question:   Release to patient     Answer:   Routine Release [[id number]]    PSA Screen     Order Specific Question:   Release to patient     Answer:   Routine Release [[id number]]     New Medications Ordered This Visit   Medications    lisinopril (PRINIVIL,ZESTRIL) 10 MG tablet     Sig: Take 1 tablet by mouth Daily.     Dispense:  90 tablet     Refill:  1       SUMMARY/DISCUSSION  Comprehensive vaccine/immunization recommendations provided.       TOTAL TIME OF ENCOUNTER:        Next Appointment with me: Visit date not found    Return in about 4 months (around 2/23/2025) for Reassess chronic medical problems.      VITAL SIGNS     Vitals:    10/23/24 0745   BP: 120/82   Pulse: 73   Temp: 96.9 °F (36.1 °C)   SpO2: 97%   Weight: 106 kg (234 lb)   Height: 167.6 cm (66\")       BP Readings from Last 3 Encounters:   10/23/24 120/82   06/18/24 132/90   01/08/24 128/80     Wt Readings from Last 3 Encounters:   10/23/24 106 kg (234 lb)   06/18/24 104 kg (230 lb)   02/21/24 105 kg (230 lb 6.4 oz)     Body mass index is 37.77 kg/m².    Blood pressure " readings recorded on patient flowsheet:  [facility] Blood Pressure Flowsheet Systolic Diastolic Pulse   7/7/2022  10:08   80  81    6/17/2022  10:21   77  74    6/10/2022  10:03   81  80    6/9/2022   9:56   76  74    6/8/2022   9:35   77  85    6/6/2022   9:35   83  73    6/3/2022   9:50   86  84    6/2/2022   9:59   73  74    6/1/2022   9:28   80  80    5/31/2022   9:13   84  85        Patient-reported         MEDICATIONS AT THE TIME OF OFFICE VISIT     Current Outpatient Medications on File Prior to Visit   Medication Sig    Blood Glucose Monitoring Suppl (ONE TOUCH ULTRA 2) w/Device kit Check sugar daily.    diphenhydrAMINE (BENADRYL) 25 mg capsule Take 1 capsule by mouth Every Night.    glucose blood (OneTouch Ultra) test strip Use as instructed    Lancets (onetouch ultrasoft) lancets Use as instructed    loratadine (CLARITIN) 10 MG tablet Take 1 tablet by mouth Daily.    melatonin 5 MG tablet tablet Take 1 tablet by mouth At Night As Needed.    metFORMIN ER (GLUCOPHAGE-XR) 500 MG 24 hr tablet TAKE 1 TABLET WITH DINNER    omeprazole (priLOSEC) 20 MG capsule TAKE 1 CAPSULE DAILY    rosuvastatin (CRESTOR) 10 MG tablet TAKE 1 TABLET DAILY    tamsulosin (FLOMAX) 0.4 MG capsule 24 hr capsule TAKE 1 CAPSULE EVERY NIGHT    Wegovy 2.4 MG/0.75ML solution auto-injector INJECT 2.4 MG UNDER THE SKIN INTO THE APPROPRIATE AREA AS DIRECTED ONCE A WEEK    [DISCONTINUED] hydroCHLOROthiazide 12.5 MG tablet TAKE 1 TABLET DAILY     No current facility-administered medications on file prior to visit.          HISTORY OF PRESENT ILLNESS     Denies any significant problems or changes.  He is tolerating Wegovy 2.4 mg weekly without significant GI side effects.  He is also on metformin  mg daily for diabetes.  Hypertension remains stable on hydrochlorothiazide however he is not on an ACE inhibitor for diabetes.  He is on rosuvastatin 10 mg for hyperlipidemia without  significant problems.  Weight gain of 4 pounds is noted since the last visit.  He also takes tamsulosin 0.4 mg nightly for BPH symptoms.    Patient Care Team:  Bill Torres MD as PCP - General (Internal Medicine)  Nelia Chakraborty OD (Optometry)    REVIEW OF SYSTEMS     Review of Systems       PHYSICAL EXAMINATION     Physical Exam  Alert with normal thought and judgment.   Obesity   Cardiovascular: Normal rate, regular rhythm.   Pulm/Chest: Effort normal, breath sounds normal.   No lower extremity edema       REVIEWED DATA     Labs:     Lab Results   Component Value Date     01/08/2024    K 4.6 01/08/2024    CALCIUM 9.6 01/08/2024    AST 16 01/08/2024    ALT 14 01/08/2024    BUN 16 01/08/2024    CREATININE 1.24 01/08/2024    CREATININE 1.35 (H) 08/02/2023    CREATININE 1.19 11/21/2022    EGFRRESULT 65.3 01/08/2024     Lab Results   Component Value Date    HGBA1C 5.60 06/18/2024    HGBA1C 5.80 (H) 01/08/2024    HGBA1C 5.70 (H) 08/02/2023     Lab Results   Component Value Date    LDL 45 08/02/2023    LDL 48 11/21/2022    LDL 70 12/08/2021    HDL 58 08/02/2023    HDL 66 (H) 11/21/2022    TRIG 95 08/02/2023    TRIG 148 11/21/2022     Lab Results   Component Value Date    TSH 3.300 12/08/2021    TSH 2.010 05/23/2019    FREET4 0.94 12/08/2021    FREET4 1.02 05/23/2019     Lab Results   Component Value Date    WBC 5.35 03/31/2023    HGB 12.3 (L) 03/31/2023     03/31/2023     Lab Results   Component Value Date    MALBCRERATIO  11/29/2023      Comment:      Unable to calculate           Imaging:               Medical Tests:               Summary of old records / correspondence / consultant report:             Request outside records:

## 2024-10-23 NOTE — ASSESSMENT & PLAN NOTE
Discontinue HCTZ and start lisinopril 10 mg daily     Continue metformin ER and Wegovy 2.4 mg.   Request last diabetic eye exam.

## 2024-10-23 NOTE — ASSESSMENT & PLAN NOTE
Discontinue HCTZ 12.5 mg and start lisinopril 10 mg daily.   Monitor home blood pressure readings.      BP Readings from Last 3 Encounters:   10/23/24 120/82   06/18/24 132/90   01/08/24 128/80

## 2024-10-23 NOTE — ASSESSMENT & PLAN NOTE
Lab Results   Component Value Date    LDL 45 08/02/2023    LDL 48 11/21/2022    LDL 70 12/08/2021    TRIG 95 08/02/2023    CHOLHDLRATIO 2.09 08/02/2023       Check labs today.  Continue rosuvastatin 10 mg daily

## 2024-10-24 DIAGNOSIS — K21.9 GASTROESOPHAGEAL REFLUX DISEASE WITHOUT ESOPHAGITIS: ICD-10-CM

## 2024-10-24 LAB
ALBUMIN SERPL-MCNC: 4.1 G/DL (ref 3.5–5.2)
ALBUMIN/CREAT UR: <2 MG/G CREAT (ref 0–29)
ALBUMIN/GLOB SERPL: 1.6 G/DL
ALP SERPL-CCNC: 61 U/L (ref 39–117)
ALT SERPL-CCNC: 15 U/L (ref 1–41)
AST SERPL-CCNC: 20 U/L (ref 1–40)
BILIRUB SERPL-MCNC: 0.4 MG/DL (ref 0–1.2)
BUN SERPL-MCNC: 16 MG/DL (ref 8–23)
BUN/CREAT SERPL: 12 (ref 7–25)
CALCIUM SERPL-MCNC: 9.7 MG/DL (ref 8.6–10.5)
CHLORIDE SERPL-SCNC: 102 MMOL/L (ref 98–107)
CHOLEST SERPL-MCNC: 139 MG/DL (ref 0–200)
CHOLEST/HDLC SERPL: 2.14 {RATIO}
CO2 SERPL-SCNC: 27.2 MMOL/L (ref 22–29)
CREAT SERPL-MCNC: 1.33 MG/DL (ref 0.76–1.27)
CREAT UR-MCNC: 173.2 MG/DL
EGFRCR SERPLBLD CKD-EPI 2021: 59.7 ML/MIN/1.73
GLOBULIN SER CALC-MCNC: 2.6 GM/DL
GLUCOSE SERPL-MCNC: 90 MG/DL (ref 65–99)
HBA1C MFR BLD: 5.4 % (ref 4.8–5.6)
HDLC SERPL-MCNC: 65 MG/DL (ref 40–60)
LDLC SERPL CALC-MCNC: 50 MG/DL (ref 0–100)
MICROALBUMIN UR-MCNC: <3 UG/ML
POTASSIUM SERPL-SCNC: 4.1 MMOL/L (ref 3.5–5.2)
PROT SERPL-MCNC: 6.7 G/DL (ref 6–8.5)
PSA SERPL-MCNC: 0.34 NG/ML (ref 0–4)
SODIUM SERPL-SCNC: 138 MMOL/L (ref 136–145)
T4 FREE SERPL-MCNC: 1.09 NG/DL (ref 0.92–1.68)
TRIGL SERPL-MCNC: 139 MG/DL (ref 0–150)
TSH SERPL DL<=0.005 MIU/L-ACNC: 2.36 UIU/ML (ref 0.27–4.2)
VLDLC SERPL CALC-MCNC: 24 MG/DL (ref 5–40)

## 2024-10-24 NOTE — PROGRESS NOTES
MyChart:    Labs for kidney, liver and electrolytes are stable (i.e.  normal, stable, improved or without clinically significant worsening)     A1c level for blood sugar average is stable/better.     Cholesterol is overall stable.    Thyroid level is stable (or normal).    Urine is negative for protein.     PSA level (for prostate cancer screening) is stable.

## 2024-10-28 ENCOUNTER — TELEPHONE (OUTPATIENT)
Dept: INTERNAL MEDICINE | Age: 64
End: 2024-10-28
Payer: COMMERCIAL

## 2024-10-28 NOTE — TELEPHONE ENCOUNTER
----- Message from Bill Torres sent at 10/23/2024  8:15 AM EDT -----  Regarding: Obtain diabetic eye exam report from eye doctor  IMPORTANT:  Obtain diabetic eye exam report and scan into chart.

## 2025-01-07 DIAGNOSIS — E66.01 CLASS 3 SEVERE OBESITY DUE TO EXCESS CALORIES WITH SERIOUS COMORBIDITY AND BODY MASS INDEX (BMI) OF 40.0 TO 44.9 IN ADULT: ICD-10-CM

## 2025-01-07 DIAGNOSIS — E66.813 CLASS 3 SEVERE OBESITY DUE TO EXCESS CALORIES WITH SERIOUS COMORBIDITY AND BODY MASS INDEX (BMI) OF 40.0 TO 44.9 IN ADULT: ICD-10-CM

## 2025-01-08 DIAGNOSIS — E78.5 HYPERLIPIDEMIA, UNSPECIFIED HYPERLIPIDEMIA TYPE: Chronic | ICD-10-CM

## 2025-01-08 DIAGNOSIS — N40.1 BENIGN PROSTATIC HYPERPLASIA WITH LOWER URINARY TRACT SYMPTOMS, SYMPTOM DETAILS UNSPECIFIED: ICD-10-CM

## 2025-01-08 RX ORDER — TAMSULOSIN HYDROCHLORIDE 0.4 MG/1
CAPSULE ORAL
Qty: 90 CAPSULE | Refills: 1 | Status: SHIPPED | OUTPATIENT
Start: 2025-01-08

## 2025-01-08 RX ORDER — ROSUVASTATIN CALCIUM 10 MG/1
TABLET, COATED ORAL
Qty: 90 TABLET | Refills: 1 | Status: SHIPPED | OUTPATIENT
Start: 2025-01-08

## 2025-01-08 RX ORDER — SEMAGLUTIDE 2.4 MG/.75ML
INJECTION, SOLUTION SUBCUTANEOUS
Qty: 9 ML | Refills: 0 | Status: SHIPPED | OUTPATIENT
Start: 2025-01-08

## 2025-01-22 DIAGNOSIS — K21.9 GASTROESOPHAGEAL REFLUX DISEASE WITHOUT ESOPHAGITIS: ICD-10-CM

## 2025-02-20 ENCOUNTER — OFFICE VISIT (OUTPATIENT)
Dept: SLEEP MEDICINE | Facility: HOSPITAL | Age: 65
End: 2025-02-20
Payer: COMMERCIAL

## 2025-02-20 VITALS — WEIGHT: 229 LBS | BODY MASS INDEX: 36.8 KG/M2 | HEIGHT: 66 IN | HEART RATE: 87 BPM | OXYGEN SATURATION: 96 %

## 2025-02-20 DIAGNOSIS — G47.36 HYPOXEMIA ASSOCIATED WITH SLEEP: Primary | ICD-10-CM

## 2025-02-20 DIAGNOSIS — G47.14 HYPERSOMNIA DUE TO MEDICAL CONDITION: ICD-10-CM

## 2025-02-20 DIAGNOSIS — E66.812 CLASS 2 SEVERE OBESITY DUE TO EXCESS CALORIES WITH SERIOUS COMORBIDITY AND BODY MASS INDEX (BMI) OF 36.0 TO 36.9 IN ADULT: ICD-10-CM

## 2025-02-20 DIAGNOSIS — G47.33 OSA (OBSTRUCTIVE SLEEP APNEA): Chronic | ICD-10-CM

## 2025-02-20 DIAGNOSIS — E66.01 CLASS 2 SEVERE OBESITY DUE TO EXCESS CALORIES WITH SERIOUS COMORBIDITY AND BODY MASS INDEX (BMI) OF 36.0 TO 36.9 IN ADULT: ICD-10-CM

## 2025-02-20 PROCEDURE — G0463 HOSPITAL OUTPT CLINIC VISIT: HCPCS

## 2025-02-20 NOTE — PROGRESS NOTES
"[facility]  Follow Up Sleep Disorders Center Note     Chief Complaint:  JULIO CESAR     Primary Care Physician: Bill Torres MD    Interval History:   The patient is a 64 y.o. male who I last saw 2024 and that note was reviewed.  The patient reports that he had a cold congestion and a cough first part of December.  He states he is finally getting better.  He goes to bed at 10 PM gets out of bed at 6 AM.  He will use the restroom during that time.    Review of Systems:    A complete review of systems was done and all were negative with the exception of the above    Social History:    Social History     Socioeconomic History    Marital status: Single    Number of children: 0   Tobacco Use    Smoking status: Former     Current packs/day: 0.00     Average packs/day: 0.5 packs/day for 20.9 years (10.5 ttl pk-yrs)     Types: Cigarettes     Start date: 1980     Quit date: 2000     Years since quittin.2    Smokeless tobacco: Former     Types: Snuff, Chew   Vaping Use    Vaping status: Never Used   Substance and Sexual Activity    Alcohol use: Yes     Alcohol/week: 1.0 - 5.0 standard drink of alcohol     Types: 1 - 5 Cans of beer per week     Comment: Occasional drinker.    Drug use: No    Sexual activity: Not Currently     Partners: Female     Birth control/protection: None     Comment: Abstinent for 20+ years       Allergies:  No known drug allergy     Medication Review: His list was reviewed.      Vital Signs:    Vitals:    25 0832   Pulse: 87   SpO2: 96%   Weight: 104 kg (229 lb)   Height: 167.6 cm (66\")     Body mass index is 36.96 kg/m².    Physical Exam:    Constitutional:  Well developed 64 y.o. male that appears in no apparent distress.  Awake & oriented times 3.  Normal mood with normal recent and remote memory and normal judgement.  Eyes:  Conjunctivae normal.  Oropharynx: Previously, moist mucous membranes without exudate and a large tongue and class III Mallampati " airway.    Self-administered Corn[place] Sleepiness Scale test results: 8, previously 7 and prior to that 7  0-5 Lower normal daytime sleepiness  6-10 Higher normal daytime sleepiness  11-12 Mild, 13-15 Moderate, & 16-24 Severe excessive daytime sleepiness     Downloaded PAP Data Evaluated For Therapeutic Response and Compliance:  DME is [facility] and the patient uses a fullface mask.  Downloads between 11/21 and 2/18/2025 compliance 99%.  Average usage is 3 hours and 56 minutes.  Average AHI is normal without leak.  Average auto CPAP pressure is 14.9 and his ResMed auto CPAP is 11-18    I have reviewed the above results and compared them with the patient's last downloads and reviewed with the patient.    Since 1 January 2025, the patient's compliance and usage has increased.    Impression:   Severe obstructive sleep apnea with sleep-related hypoxia by home sleep study 8/3/2022, weight 238 pounds, adequately treated with ResMed auto CPAP. The patient appears to be at goal with good compliance and usage. The patient has some complaints of hypersomnolence.     Plan:  Good sleep hygiene measures should be maintained.  Weight loss would be beneficial in this patient who has class II severe obesity by Body mass index is 36.96 kg/m².      After evaluating the patient and assessing results available, the patient is benefiting from the treatment being provided.     The patient will continue ResMed auto CPAP.  Potential side effects of not using PAP therapy reviewed and addressed as needed.  After clinical evaluation and review of downloads, I recommend no changes to the patient's pressures.  A new prescription will be sent to the patient's DME.    I answered all of the patient's questions.  The patient will call the Sleep Disorder Center for any problems and will follow up in 1 year.      Juancarlos Jean MD  Sleep Medicine  02/20/25  08:36 EST

## 2025-03-03 ENCOUNTER — OFFICE VISIT (OUTPATIENT)
Dept: INTERNAL MEDICINE | Age: 65
End: 2025-03-03
Payer: COMMERCIAL

## 2025-03-03 VITALS
SYSTOLIC BLOOD PRESSURE: 118 MMHG | HEIGHT: 66 IN | WEIGHT: 229 LBS | BODY MASS INDEX: 36.8 KG/M2 | OXYGEN SATURATION: 99 % | HEART RATE: 70 BPM | DIASTOLIC BLOOD PRESSURE: 80 MMHG | TEMPERATURE: 97.1 F

## 2025-03-03 DIAGNOSIS — E78.2 MIXED HYPERLIPIDEMIA: Chronic | ICD-10-CM

## 2025-03-03 DIAGNOSIS — E66.01 CLASS 2 SEVERE OBESITY DUE TO EXCESS CALORIES WITH SERIOUS COMORBIDITY AND BODY MASS INDEX (BMI) OF 36.0 TO 36.9 IN ADULT: Chronic | ICD-10-CM

## 2025-03-03 DIAGNOSIS — E11.9 TYPE 2 DIABETES MELLITUS WITHOUT COMPLICATION, WITHOUT LONG-TERM CURRENT USE OF INSULIN: Primary | Chronic | ICD-10-CM

## 2025-03-03 DIAGNOSIS — I10 ESSENTIAL HYPERTENSION: Chronic | ICD-10-CM

## 2025-03-03 DIAGNOSIS — E66.812 CLASS 2 SEVERE OBESITY DUE TO EXCESS CALORIES WITH SERIOUS COMORBIDITY AND BODY MASS INDEX (BMI) OF 36.0 TO 36.9 IN ADULT: Chronic | ICD-10-CM

## 2025-03-03 LAB — HBA1C MFR BLD: 5.5 % (ref 4.8–5.6)

## 2025-03-03 PROCEDURE — 99214 OFFICE O/P EST MOD 30 MIN: CPT | Performed by: INTERNAL MEDICINE

## 2025-03-03 NOTE — ASSESSMENT & PLAN NOTE
BP Readings from Last 3 Encounters:   03/03/25 118/80   10/23/24 120/82   06/18/24 132/90      Blood pressure is well controlled.   Continue lisinopril 10 mg daily.

## 2025-03-03 NOTE — ASSESSMENT & PLAN NOTE
Wt Readings from Last 3 Encounters:   03/03/25 104 kg (229 lb)   02/20/25 104 kg (229 lb)   10/23/24 106 kg (234 lb)     Body mass index is 36.96 kg/m².     Weight overall is stable. Continue Wegovy for now. Can consider switch to Mounjaro later if desired.

## 2025-03-03 NOTE — PROGRESS NOTES
J  U  N  O  H    K  I  M ,   M  D       I  N  T  E  R  N  A  L    M  E  D  I  C  I  N  E         ENCOUNTER DATE:  03/03/2025    Junior Estrada . / 64 y.o. / male    OFFICE VISIT ENCOUNTER       CHIEF COMPLAINT / REASON FOR OFFICE VISIT     Diabetes, Hypertension, and Hyperlipidemia      ASSESSMENT & PLAN     Problem List Items Addressed This Visit          High    Type 2 diabetes mellitus without complication, without long-term current use of insulin - Primary (Chronic)    Overview     Metformin  mg with dinner  Wegovy 2.4 mg weekly          Current Assessment & Plan     Lab Results   Component Value Date    HGBA1C 5.40 10/23/2024    HGBA1C 5.60 06/18/2024    HGBA1C 5.80 (H) 01/08/2024    CREATININE 1.33 (H) 10/23/2024    LDL 50 10/23/2024    MALBCRERATIO <2 10/23/2024      Doing well. Check A1c.          Relevant Medications    Blood Glucose Monitoring Suppl (ONE TOUCH ULTRA 2) w/Device kit    Lancets (onetouch ultrasoft) lancets    glucose blood (OneTouch Ultra) test strip    metFORMIN ER (GLUCOPHAGE-XR) 500 MG 24 hr tablet    lisinopril (PRINIVIL,ZESTRIL) 10 MG tablet    Other Relevant Orders    Hemoglobin A1c       Medium    Hyperlipidemia (Chronic)    Overview     Continue rosuvastatin 10 mg qd.          Relevant Medications    rosuvastatin (CRESTOR) 10 MG tablet    Class 2 severe obesity due to excess calories with serious comorbidity and body mass index (BMI) of 36.0 to 36.9 in adult (Chronic)    Overview     Continue Wegovy 2.4 mg weekly          Current Assessment & Plan     Wt Readings from Last 3 Encounters:   03/03/25 104 kg (229 lb)   02/20/25 104 kg (229 lb)   10/23/24 106 kg (234 lb)     Body mass index is 36.96 kg/m².     Weight overall is stable. Continue Wegovy for now. Can consider switch to Mounjaro later if desired.          Relevant Medications    Wegovy 2.4 MG/0.75ML solution auto-injector    Essential hypertension (Chronic)    Current Assessment & Plan     BP Readings from  "Last 3 Encounters:   03/03/25 118/80   10/23/24 120/82   06/18/24 132/90      Blood pressure is well controlled.   Continue lisinopril 10 mg daily.          Relevant Medications    lisinopril (PRINIVIL,ZESTRIL) 10 MG tablet     Orders Placed This Encounter   Procedures    Hemoglobin A1c     Order Specific Question:   Release to patient     Answer:   Routine Release [[id number]]     No orders of the defined types were placed in this encounter.      SUMMARY/DISCUSSION  OBTAIN THESE VACCINES AT THE PHARMACY:    RSV, Shingrix (shingles), and Hepatitis A Series       TOTAL TIME OF ENCOUNTER:        Next Appointment with me: Visit date not found    Return in about 6 months (around 9/3/2025) for Diabetes.      VITAL SIGNS     Vitals:    03/03/25 0810   BP: 118/80   Pulse: 70   Temp: 97.1 °F (36.2 °C)   SpO2: 99%   Weight: 104 kg (229 lb)   Height: 167.6 cm (66\")       BP Readings from Last 3 Encounters:   03/03/25 118/80   10/23/24 120/82   06/18/24 132/90     Wt Readings from Last 3 Encounters:   03/03/25 104 kg (229 lb)   02/20/25 104 kg (229 lb)   10/23/24 106 kg (234 lb)     Body mass index is 36.96 kg/m².    Blood pressure readings recorded on patient flowsheet:  [facility] Blood Pressure Flowsheet Systolic Diastolic Pulse   7/7/2022  10:08   80  81    6/17/2022  10:21   77  74    6/10/2022  10:03   81  80    6/9/2022   9:56   76  74    6/8/2022   9:35   77  85    6/6/2022   9:35   83  73    6/3/2022   9:50   86  84    6/2/2022   9:59   73  74    6/1/2022   9:28   80  80    5/31/2022   9:13   84  85        Patient-reported         MEDICATIONS AT THE TIME OF OFFICE VISIT     Current Outpatient Medications on File Prior to Visit   Medication Sig    Blood Glucose Monitoring Suppl (ONE TOUCH ULTRA 2) w/Device kit Check sugar daily.    diphenhydrAMINE (BENADRYL) 25 mg capsule Take 1 capsule by mouth Every Night.    glucose blood (OneTouch Ultra) test strip Use as " instructed    Lancets (onetouch ultrasoft) lancets Use as instructed    lisinopril (PRINIVIL,ZESTRIL) 10 MG tablet Take 1 tablet by mouth Daily.    loratadine (CLARITIN) 10 MG tablet Take 1 tablet by mouth Daily.    melatonin 5 MG tablet tablet Take 1 tablet by mouth At Night As Needed.    metFORMIN ER (GLUCOPHAGE-XR) 500 MG 24 hr tablet TAKE 1 TABLET WITH DINNER    omeprazole (priLOSEC) 20 MG capsule TAKE 1 CAPSULE DAILY    rosuvastatin (CRESTOR) 10 MG tablet TAKE 1 TABLET DAILY    tamsulosin (FLOMAX) 0.4 MG capsule 24 hr capsule TAKE 1 CAPSULE EVERY NIGHT    Wegovy 2.4 MG/0.75ML solution auto-injector INJECT 2.4 MG UNDER THE SKIN INTO THE APPROPRIATE AREA AS DIRECTED ONCE A WEEK     No current facility-administered medications on file prior to visit.          HISTORY OF PRESENT ILLNESS     Blood pressure is well-controlled with lisinopril 10 mg.  Previously lisinopril 10 mg was started in place of hydrochlorothiazide.  Denies significant side effects.  Diabetes remains under excellent control on Wegovy 2.4 mg weekly and metformin ER 5 mg daily.  Denies significant GI side effects.  He is on rosuvastatin 10 mg for hyperlipidemia.  His weight overall remained stable without significant change over the last year.    Patient Care Team:  Bill Torres MD as PCP - General (Internal Medicine)  Nelia Chakraborty OD (Optometry)    REVIEW OF SYSTEMS     Review of Systems   Constitutional neg except per HPI   Resp neg  CV neg   GI negative     PHYSICAL EXAMINATION     Physical Exam  Alert with normal thought and judgment.   Obesity       REVIEWED DATA     Labs:     Lab Results   Component Value Date     10/23/2024    K 4.1 10/23/2024    CALCIUM 9.7 10/23/2024    AST 20 10/23/2024    ALT 15 10/23/2024    BUN 16 10/23/2024    CREATININE 1.33 (H) 10/23/2024    CREATININE 1.24 01/08/2024    CREATININE 1.35 (H) 08/02/2023     Lab Results   Component Value Date    HGBA1C 5.40 10/23/2024    HGBA1C 5.60 06/18/2024    HGBA1C  5.80 (H) 01/08/2024     Lab Results   Component Value Date    MALBCRERATIO <2 10/23/2024     Lab Results   Component Value Date    LDL 50 10/23/2024    LDL 45 08/02/2023    LDL 48 11/21/2022    HDL 65 (H) 10/23/2024    HDL 58 08/02/2023    TRIG 139 10/23/2024    TRIG 95 08/02/2023     Lab Results   Component Value Date    TSH 2.360 10/23/2024    TSH 3.300 12/08/2021    TSH 2.010 05/23/2019    FREET4 1.09 10/23/2024    FREET4 0.94 12/08/2021    FREET4 1.02 05/23/2019     Lab Results   Component Value Date    WBC 5.35 03/31/2023    HGB 12.3 (L) 03/31/2023     03/31/2023           Imaging:               Medical Tests:               Summary of old records / correspondence / consultant report:             Request outside records:

## 2025-03-03 NOTE — ASSESSMENT & PLAN NOTE
Lab Results   Component Value Date    HGBA1C 5.40 10/23/2024    HGBA1C 5.60 06/18/2024    HGBA1C 5.80 (H) 01/08/2024    CREATININE 1.33 (H) 10/23/2024    LDL 50 10/23/2024    MALBCRERATIO <2 10/23/2024      Doing well. Check A1c.

## 2025-03-26 DIAGNOSIS — E66.01 CLASS 3 SEVERE OBESITY DUE TO EXCESS CALORIES WITH SERIOUS COMORBIDITY AND BODY MASS INDEX (BMI) OF 40.0 TO 44.9 IN ADULT: ICD-10-CM

## 2025-03-26 DIAGNOSIS — E66.813 CLASS 3 SEVERE OBESITY DUE TO EXCESS CALORIES WITH SERIOUS COMORBIDITY AND BODY MASS INDEX (BMI) OF 40.0 TO 44.9 IN ADULT: ICD-10-CM

## 2025-03-27 RX ORDER — SEMAGLUTIDE 2.4 MG/.75ML
INJECTION, SOLUTION SUBCUTANEOUS
Qty: 9 ML | Refills: 0 | Status: SHIPPED | OUTPATIENT
Start: 2025-03-27

## 2025-03-31 DIAGNOSIS — E11.9 TYPE 2 DIABETES MELLITUS WITHOUT COMPLICATION, WITHOUT LONG-TERM CURRENT USE OF INSULIN: Chronic | ICD-10-CM

## 2025-03-31 DIAGNOSIS — I10 ESSENTIAL HYPERTENSION: Chronic | ICD-10-CM

## 2025-03-31 RX ORDER — LISINOPRIL 10 MG/1
10 TABLET ORAL DAILY
Qty: 90 TABLET | Refills: 1 | Status: SHIPPED | OUTPATIENT
Start: 2025-03-31

## 2025-04-02 ENCOUNTER — PRIOR AUTHORIZATION (OUTPATIENT)
Dept: INTERNAL MEDICINE | Age: 65
End: 2025-04-02
Payer: COMMERCIAL

## 2025-04-08 DIAGNOSIS — R73.03 PREDIABETES: Chronic | ICD-10-CM

## 2025-04-08 RX ORDER — METFORMIN HYDROCHLORIDE 500 MG/1
TABLET, EXTENDED RELEASE ORAL
Qty: 90 TABLET | Refills: 1 | Status: SHIPPED | OUTPATIENT
Start: 2025-04-08

## 2025-07-07 DIAGNOSIS — E66.813 CLASS 3 SEVERE OBESITY DUE TO EXCESS CALORIES WITH SERIOUS COMORBIDITY AND BODY MASS INDEX (BMI) OF 40.0 TO 44.9 IN ADULT: ICD-10-CM

## 2025-07-08 DIAGNOSIS — N40.1 BENIGN PROSTATIC HYPERPLASIA WITH LOWER URINARY TRACT SYMPTOMS, SYMPTOM DETAILS UNSPECIFIED: ICD-10-CM

## 2025-07-08 DIAGNOSIS — E78.5 HYPERLIPIDEMIA, UNSPECIFIED HYPERLIPIDEMIA TYPE: Chronic | ICD-10-CM

## 2025-07-08 RX ORDER — TAMSULOSIN HYDROCHLORIDE 0.4 MG/1
1 CAPSULE ORAL NIGHTLY
Qty: 90 CAPSULE | Refills: 1 | Status: SHIPPED | OUTPATIENT
Start: 2025-07-08

## 2025-07-08 RX ORDER — SEMAGLUTIDE 2.4 MG/.75ML
INJECTION, SOLUTION SUBCUTANEOUS
Qty: 9 ML | Refills: 0 | Status: SHIPPED | OUTPATIENT
Start: 2025-07-08

## 2025-07-08 RX ORDER — ROSUVASTATIN CALCIUM 10 MG/1
10 TABLET, COATED ORAL DAILY
Qty: 90 TABLET | Refills: 1 | Status: SHIPPED | OUTPATIENT
Start: 2025-07-08

## (undated) DEVICE — SNAR POLYP SENSATION STDOVL 27 240 BX40

## (undated) DEVICE — THE TORRENT IRRIGATION SCOPE CONNECTOR IS USED WITH THE TORRENT IRRIGATION TUBING TO PROVIDE IRRIGATION FLUIDS SUCH AS STERILE WATER DURING GASTROINTESTINAL ENDOSCOPIC PROCEDURES WHEN USED IN CONJUNCTION WITH AN IRRIGATION PUMP (OR ELECTROSURGICAL UNIT).: Brand: TORRENT

## (undated) DEVICE — CANN NASL CO2 TRULINK W/O2 A/

## (undated) DEVICE — KT ORCA ORCAPOD DISP STRL

## (undated) DEVICE — SNAR POLYP CAPTIVATOR RND STFF 2.4 240CM 10MM 1P/U

## (undated) DEVICE — THE SINGLE USE ETRAP – POLYP TRAP IS USED FOR SUCTION RETRIEVAL OF ENDOSCOPICALLY REMOVED POLYPS.: Brand: ETRAP

## (undated) DEVICE — ADAPT CLN BIOGUARD AIR/H2O DISP

## (undated) DEVICE — TUBING, SUCTION, 1/4" X 10', STRAIGHT: Brand: MEDLINE

## (undated) DEVICE — MSK PROC CURAPLEX O2 2/ADAPT 7FT

## (undated) DEVICE — SENSR O2 OXIMAX FNGR A/ 18IN NONSTR

## (undated) DEVICE — Device: Brand: DEFENDO AIR/WATER/SUCTION AND BIOPSY VALVE

## (undated) DEVICE — LN SMPL CO2 SHTRM SD STREAM W/M LUER